# Patient Record
Sex: MALE | Race: WHITE | NOT HISPANIC OR LATINO | Employment: OTHER | URBAN - METROPOLITAN AREA
[De-identification: names, ages, dates, MRNs, and addresses within clinical notes are randomized per-mention and may not be internally consistent; named-entity substitution may affect disease eponyms.]

---

## 2017-06-15 ENCOUNTER — OFFICE VISIT - RIVER FALLS (OUTPATIENT)
Dept: FAMILY MEDICINE | Facility: CLINIC | Age: 62
End: 2017-06-15

## 2017-06-15 ASSESSMENT — MIFFLIN-ST. JEOR: SCORE: 1388.43

## 2018-02-06 ENCOUNTER — OFFICE VISIT - RIVER FALLS (OUTPATIENT)
Dept: FAMILY MEDICINE | Facility: CLINIC | Age: 63
End: 2018-02-06

## 2018-02-06 ASSESSMENT — MIFFLIN-ST. JEOR: SCORE: 1414.74

## 2018-02-07 LAB
CHOLEST SERPL-MCNC: 187 MG/DL
CHOLEST/HDLC SERPL: 2.9 {RATIO}
GLUCOSE BLD-MCNC: 97 MG/DL (ref 65–99)
HDLC SERPL-MCNC: 65 MG/DL
LDLC SERPL CALC-MCNC: 104 MG/DL
NONHDLC SERPL-MCNC: 122 MG/DL
PSA SERPL-MCNC: 0.9 NG/ML
TRIGL SERPL-MCNC: 87 MG/DL

## 2018-12-20 ENCOUNTER — AMBULATORY - RIVER FALLS (OUTPATIENT)
Dept: FAMILY MEDICINE | Facility: CLINIC | Age: 63
End: 2018-12-20

## 2019-01-31 ENCOUNTER — OFFICE VISIT - RIVER FALLS (OUTPATIENT)
Dept: FAMILY MEDICINE | Facility: CLINIC | Age: 64
End: 2019-01-31

## 2019-01-31 ASSESSMENT — MIFFLIN-ST. JEOR: SCORE: 1398.41

## 2019-02-01 ENCOUNTER — AMBULATORY - RIVER FALLS (OUTPATIENT)
Dept: FAMILY MEDICINE | Facility: CLINIC | Age: 64
End: 2019-02-01

## 2019-02-02 LAB
CHOLEST SERPL-MCNC: 186 MG/DL
CHOLEST/HDLC SERPL: 2.4 {RATIO}
GLUCOSE BLD-MCNC: 90 MG/DL (ref 65–99)
HBA1C MFR BLD: 5.6 %
HDLC SERPL-MCNC: 77 MG/DL
LDLC SERPL CALC-MCNC: 93 MG/DL
NONHDLC SERPL-MCNC: 109 MG/DL
TRIGL SERPL-MCNC: 75 MG/DL

## 2019-12-18 ENCOUNTER — AMBULATORY - RIVER FALLS (OUTPATIENT)
Dept: FAMILY MEDICINE | Facility: CLINIC | Age: 64
End: 2019-12-18

## 2020-01-21 ENCOUNTER — OFFICE VISIT - RIVER FALLS (OUTPATIENT)
Dept: FAMILY MEDICINE | Facility: CLINIC | Age: 65
End: 2020-01-21

## 2020-01-21 ASSESSMENT — MIFFLIN-ST. JEOR: SCORE: 1434.7

## 2020-01-22 LAB
CHOL/HDL RATIO - HISTORICAL: 2.9 UMOL/L
CHOLEST SERPL-MCNC: 167 MG/DL
GLUCOSE BLD-MCNC: 100 MG/DL
HDLC SERPL-MCNC: 57 MG/DL
LDLC SERPL CALC-MCNC: 94 MG/DL
TRIGL SERPL-MCNC: 71 MG/DL

## 2020-12-01 ENCOUNTER — AMBULATORY - RIVER FALLS (OUTPATIENT)
Dept: FAMILY MEDICINE | Facility: CLINIC | Age: 65
End: 2020-12-01

## 2020-12-03 ENCOUNTER — AMBULATORY - RIVER FALLS (OUTPATIENT)
Dept: FAMILY MEDICINE | Facility: CLINIC | Age: 65
End: 2020-12-03

## 2021-01-20 ENCOUNTER — OFFICE VISIT - RIVER FALLS (OUTPATIENT)
Dept: FAMILY MEDICINE | Facility: CLINIC | Age: 66
End: 2021-01-20

## 2021-01-20 ASSESSMENT — MIFFLIN-ST. JEOR: SCORE: 1436.51

## 2021-01-27 ENCOUNTER — COMMUNICATION - RIVER FALLS (OUTPATIENT)
Dept: FAMILY MEDICINE | Facility: CLINIC | Age: 66
End: 2021-01-27

## 2021-04-08 ENCOUNTER — AMBULATORY - RIVER FALLS (OUTPATIENT)
Dept: FAMILY MEDICINE | Facility: CLINIC | Age: 66
End: 2021-04-08

## 2021-05-06 ENCOUNTER — AMBULATORY - RIVER FALLS (OUTPATIENT)
Dept: FAMILY MEDICINE | Facility: CLINIC | Age: 66
End: 2021-05-06

## 2021-05-27 LAB
CHOLEST SERPL-MCNC: 197 MG/DL
GLUCOSE BLD-MCNC: 104 MG/DL
HDLC SERPL-MCNC: 67 MG/DL
LDLC SERPL CALC-MCNC: 113 MG/DL
PSA SERPL-MCNC: 1.3 NG/ML
TRIGL SERPL-MCNC: 80 MG/DL

## 2021-12-22 ENCOUNTER — COMMUNICATION - RIVER FALLS (OUTPATIENT)
Dept: FAMILY MEDICINE | Facility: CLINIC | Age: 66
End: 2021-12-22

## 2022-02-11 VITALS
SYSTOLIC BLOOD PRESSURE: 120 MMHG | WEIGHT: 155.6 LBS | HEART RATE: 64 BPM | HEIGHT: 65 IN | BODY MASS INDEX: 25.92 KG/M2 | DIASTOLIC BLOOD PRESSURE: 78 MMHG | TEMPERATURE: 97.2 F

## 2022-02-11 VITALS
BODY MASS INDEX: 24.96 KG/M2 | TEMPERATURE: 97.3 F | WEIGHT: 149.8 LBS | HEART RATE: 64 BPM | DIASTOLIC BLOOD PRESSURE: 88 MMHG | SYSTOLIC BLOOD PRESSURE: 126 MMHG | HEIGHT: 65 IN

## 2022-02-11 VITALS
TEMPERATURE: 97.6 F | BODY MASS INDEX: 26.66 KG/M2 | DIASTOLIC BLOOD PRESSURE: 64 MMHG | WEIGHT: 160 LBS | HEIGHT: 65 IN | HEART RATE: 62 BPM | SYSTOLIC BLOOD PRESSURE: 118 MMHG

## 2022-02-11 VITALS
SYSTOLIC BLOOD PRESSURE: 134 MMHG | BODY MASS INDEX: 26.73 KG/M2 | HEIGHT: 65 IN | HEART RATE: 71 BPM | DIASTOLIC BLOOD PRESSURE: 88 MMHG | WEIGHT: 160.4 LBS | TEMPERATURE: 97.3 F | OXYGEN SATURATION: 92 %

## 2022-02-11 VITALS
HEIGHT: 65 IN | BODY MASS INDEX: 25.33 KG/M2 | DIASTOLIC BLOOD PRESSURE: 68 MMHG | HEART RATE: 64 BPM | SYSTOLIC BLOOD PRESSURE: 112 MMHG | WEIGHT: 152 LBS | TEMPERATURE: 97.7 F

## 2022-02-14 ENCOUNTER — OFFICE VISIT - RIVER FALLS (OUTPATIENT)
Dept: FAMILY MEDICINE | Facility: CLINIC | Age: 67
End: 2022-02-14

## 2022-02-15 NOTE — PROGRESS NOTES
Patient:   VIPIN FRANCISCO            MRN: [id number]            FIN: [id number]               Age:   64 years     Sex:  Male     :  1955   Associated Diagnoses:   Well adult; HLD (hyperlipidemia); Prediabetes; Agatston coronary artery calcium score less than 100   Author:   Wayne Reddy MD      Visit Information      Date of Service: 2020 08:11 am  Performing Location: [facility]  Encounter#: [id number]      Primary Care Provider (PCP):  Wayne Reddy MD    NPI# [id number]      Referring Provider:  Wayne Reddy MD# [id number]   Visit type:  Annual exam.    Accompanied by:  No one.    Source of history:  Self, Medical record.    History limitation:  None.       Chief Complaint   2020 8:23 AM CST    Annual Physical      Well Adult History   Well Adult History             The patient presents for well adult exam.  The patient's general health status is described as good.  The patient's diet is described as balanced.  Exercise: routine.  Associated symptoms consist of none.  Medical encounters: none.  Compliance problems: none.  Additional pertinent history: daily caffeine use, tobacco use none and occasional alcohol use.       colonoscopy:  due  lipid and diabetes screening:  due  prostate cancer screening:  up to date   heart disease risk:  moderate  immunizations:  up to date   other:  hyperlipidemia doing well         Review of Systems   Constitutional:  No fever, No chills, No sweats, No weakness, No fatigue.    Eye:  No recent visual problem.    Ear/Nose/Mouth/Throat:  No decreased hearing, No nasal congestion, No sore throat.    Respiratory:  No shortness of breath, No cough.    Cardiovascular:  Negative, No chest pain, No palpitations, No peripheral edema.    Gastrointestinal:  No nausea, No vomiting, No diarrhea, No constipation, No heartburn.    Genitourinary:  No dysuria, No change in urine stream.    Hematology/Lymphatics:  No bruising tendency, No  bleeding tendency.    Endocrine:  No cold intolerance, No heat intolerance.    Immunologic:  Negative.    Musculoskeletal:  No back pain, No neck pain, No joint pain, No muscle pain.    Integumentary:  No rash, No dryness, No skin lesion.    Neurologic:  Alert and oriented X4, No headache.    Psychiatric:  No anxiety, No depression.      PHQ9 and CAGE reviewed and discussed with patient.       Health Status   Allergies:    Allergic Reactions (Selected)  Severity Not Documented  Aspirin (Anaphylaxis)   Medications:  (Selected)   Prescriptions  Prescribed  Flovent HFA 44 mcg/inh inhalation aerosol: = 2 puff(s), Inhale, bid, # 1 EA, 3 Refill(s), Type: Maintenance  albuterol 90 mcg/inh inhalation aerosol: See Instructions, Instructions: 2 puffs inhaled 1-4 times daily prn, PRN: as needed for wheezing, # 3 EA, 5 Refill(s), Type: Maintenance, resending with direction clarification  atorvastatin 20 mg oral tablet: = 1 tab(s), Oral, daily, # 90 tab(s), 3 Refill(s), VIKTOR, Type: Maintenance, Pharmacy: [facility], 1 tab(s) Oral daily  predniSONE 20 mg oral tablet: See Instructions, Instructions: TAKE 1 TABLET BY MOUTH 2 DAILY AS NEEDED FOR ALLERGY SYMPTOMS, # 60 tab(s), Type: Soft Stop, Pharmacy: [facility]/pharmacy #[id number]   Problem list:    All Problems  Right shoulder tendonitis / SNOMED CT 8349638786 / Confirmed  Left hydrocele / SNOMED CT 5741932835 / Confirmed  Prediabetes / SNOMED CT 5809706068 / Confirmed  Right inguinal hernia / SNOMED CT 837750018 / Confirmed  Seasonal allergies / SNOMED CT 887591096 / Confirmed  HLD (hyperlipidemia) / SNOMED CT 29679523 / Confirmed      Histories   Past Medical History:    Active  Prediabetes (7874262859): Onset in 2007 at 52 years.  HLD (hyperlipidemia) (44402848)  Right inguinal hernia (860670553)  Right shoulder tendonitis (3040319043)  Left hydrocele (1015091321)  Seasonal allergies (157078524)  Comments:  9/15/2010 CDT 11:17 AM DALILA - Sade Stewart  In the spring.    Family History:    Diabetes mellitus type 2  Father (Terry)  Myocardial infarction  Grandfather (P) ()  Grandfather (M) ()  Alzheimer's disease  Mother (Patience, )  CA - Cancer of prostate  Father (Terry)  Brother (Shayne)  Coronary artery disease  Father (Terry)  High cholesterol  Father (Terry)     Procedure history:    Cardiac computed tomography for calcium scoring (SNOMED CT 1521327441) on 2016 at 61 Years.  Comments:  2016 11:14 AM CST - Dilma Murphy  Total Agatston calcium score is 27  Left hydrocelectomy/ right spermatocelectomy on 2011 at 55 Years.  Colonoscopy (SNOMED CT 001919419) on 2009 at 53 Years.  Comments:  6/15/2017 12:56 PM CDT - Antonina MCINTYRE, Sade  Indication:  screening  Sedation:  IV  Findings:  no polyps seen  Recommendation:  f/u 10yrs  Right shoulder superolabral debridement; arthroscopic subacromial decompression on 2003 at 47 Years.  Bladder surgery/TURP in  at 43 Years.  Right hip surgery in  at 19 Years.  Tonsillectomy and adenoidectomy (SNOMED CT 016568932) in  at 7 Years.   Social History:        Alcohol Assessment            Current, 5 times per week, 2 drinks/episode average.      Tobacco Assessment            Never smoker      Substance Abuse Assessment            Never      Employment and Education Assessment            Work/School description: Contractor/Farmer.      Home and Environment Assessment            Marital status: .  Spouse/Partner name: Izabella Damon.  Risks in environment: owns secured gun.      Nutrition and Health Assessment            Type of diet: Regular.      Exercise and Physical Activity Assessment            Exercise frequency: Daily.  Exercise type: manual work.      Sexual Assessment            Sexually active: Yes.  Identifies as male, Sexual orientation: Straight or heterosexual.        Physical Examination   Vital Signs   2020 8:23 AM CST Temperature Tympanic 97.6 DegF  LOW     Peripheral Pulse Rate 62 bpm    Pulse Site Radial artery    HR Method Manual    Systolic Blood Pressure 118 mmHg    Diastolic Blood Pressure 64 mmHg    Mean Arterial Pressure 82 mmHg    BP Site Right arm    BP Method Manual      Measurements from flowsheet : Measurements   1/21/2020 8:23 AM CST Height Measured - Standard 64.5 in    Weight Measured - Standard 160 lb    BSA 1.82 m2    Body Mass Index 27.04 kg/m2  HI      General:  Alert and oriented, No acute distress.    Eye:  Pupils are equal, round and reactive to light, Extraocular movements are intact, Normal conjunctiva.    HENT:  Normocephalic, Tympanic membranes are clear, Oral mucosa is moist, No pharyngeal erythema, No sinus tenderness.    Neck:  Supple, Non-tender, No carotid bruit, No lymphadenopathy, No thyromegaly.    Respiratory:  Lungs are clear to auscultation, Respirations are non-labored, Breath sounds are equal, No chest wall tenderness.    Cardiovascular:  Normal rate, Regular rhythm, No murmur, No gallop, Good pulses equal in all extremities, Normal peripheral perfusion, No edema.    Gastrointestinal:  Soft, Non-tender, Non-distended, Normal bowel sounds, No organomegaly.    Genitourinary:  No costovertebral angle tenderness.    Lymphatics:  WNL.    Musculoskeletal:  Normal range of motion, Normal strength, No tenderness, No swelling, No deformity.    Integumentary:  Warm, Dry, No rash.    Neurologic:  Alert, Oriented, Normal sensory, Normal motor function, No focal deficits.    Psychiatric:  Cooperative, Appropriate mood & affect.       Impression and Plan   Diagnosis     Well adult (UNN68-EC Z00.00).     HLD (hyperlipidemia) (LGZ40-SF E78.5).     Agatston coronary artery calcium score less than 100 (UDH47-UU R93.1).     Prediabetes (KEF69-AL R73.03).     Course:  Progressing as expected, Well controlled.    Plan:  Discussed health maintenance, diet, exercise, BMI discussed, continue current medication, lipid panel and glucose today, set up  colonoscopy.    Orders     Orders (Selected)   Outpatient Orders  Ordered  Return to Clinic (Request): RFV: Yearly exam/physical, Return in 1 year  Prescriptions  Prescribed  Flovent HFA 44 mcg/inh inhalation aerosol: = 2 puff(s), Inhale, bid, # 1 EA, 3 Refill(s), Type: Maintenance  albuterol 90 mcg/inh inhalation aerosol: See Instructions, Instructions: 2 puffs inhaled 1-4 times daily prn, PRN: as needed for wheezing, # 3 EA, 5 Refill(s), Type: Maintenance, resending with direction clarification  atorvastatin 20 mg oral tablet: = 1 tab(s), Oral, daily, # 90 tab(s), 3 Refill(s), VIKTOR, Type: Maintenance, Pharmacy: [facility], 1 tab(s) Oral daily.

## 2022-02-15 NOTE — PROGRESS NOTES
Patient:   VIPIN FRANCISCO            MRN: [id number]            FIN: [id number]               Age:   62 years     Sex:  Male     :  1955   Associated Diagnoses:   Well adult; HLD (hyperlipidemia); Seasonal Allergies   Author:   Wayne Reddy MD      Visit Information      Date of Service: 2018 07:55 am  Performing Location: [facility]  Encounter#: [id number]      Primary Care Provider (PCP):  Derek Barriga MD    NPI# [id number]   Visit type:  Annual exam.    Accompanied by:  No one.    Source of history:  Self, Medical record.    History limitation:  None.       Chief Complaint   2018 7:59 AM CST     Physical      Well Adult History   Well Adult History             The patient presents for well adult exam.  The patient's general health status is described as good.  The patient's diet is described as balanced.  Exercise: none.  Associated symptoms consist of none.  Medical encounters: none.  Compliance problems: none.  Additional pertinent history: daily caffeine use, tobacco use none and occasional alcohol use.       colonoscopy:  Due next year  lipid and diabetes screening:  due  prostate cancer screening:  due  heart disease risk:  6.4 % 10 year   immunizations:  due for influenza  other:  doing well with atorvastatin         Review of Systems   Constitutional:  No fever, No chills, No sweats, No weakness, No fatigue.    Eye:  No recent visual problem.    Ear/Nose/Mouth/Throat:  No decreased hearing, No nasal congestion, No sore throat.    Respiratory:  No shortness of breath, No cough.    Cardiovascular:  Negative, No chest pain, No palpitations, No peripheral edema.    Gastrointestinal:  No nausea, No vomiting, No diarrhea, No constipation, No heartburn.    Genitourinary:  No dysuria, No change in urine stream.    Hematology/Lymphatics:  No bruising tendency, No bleeding tendency.    Endocrine:  No cold intolerance, No heat intolerance.    Immunologic:  Negative.     Musculoskeletal:  No back pain, No neck pain, No joint pain, No muscle pain.    Integumentary:  No rash, No dryness, No skin lesion.    Neurologic:  Alert and oriented X4, No headache.    Psychiatric:  No anxiety, No depression.      PHQ9 and CAGE reviewed and discussed with patient.       Health Status   Allergies:    Allergic Reactions (Selected)  Severity Not Documented  Aspirin (Anaphylaxis)   Medications:  (Selected)   Prescriptions  Prescribed  atorvastatin 20 mg oral tablet: 1 tab(s) ( 20 mg ), PO, Daily, # 90 tab(s), 3 Refill(s), Type: Maintenance, Pharmacy: [facility], 1 tab(s) po daily   Problem list:    All Problems  HLD (hyperlipidemia) / SNOMED CT 57737075 / Confirmed  Left hydrocele / SNOMED CT 8716136674 / Confirmed  Prediabetes / SNOMED CT 8960374205 / Confirmed  Right inguinal hernia / SNOMED CT 637508726 / Confirmed  Right shoulder tendonitis / SNOMED CT 7831294606 / Confirmed  Seasonal allergies / SNOMED CT 647764789 / Confirmed      Histories   Past Medical History:    Active  Prediabetes (7851025362): Onset in  at 52 years.  HLD (hyperlipidemia) (29209147)  Right inguinal hernia (686981031)  Right shoulder tendonitis (5381931981)  Left hydrocele (6245136900)  Seasonal allergies (168993819)  Comments:  9/15/2010 CDT 11:17 AM CDT - Sade Stewart  In the spring.   Family History:    Diabetes mellitus type 2  Father (Terry)  Myocardial infarction  Grandfather (P) ()  Grandfather (M) ()  Alzheimer's disease  Mother (Patience, )  CA - Cancer of prostate  Father (Terry)  Brother (Shayne)  Coronary artery disease  Father (Terry)     Procedure history:    Cardiac computed tomography for calcium scoring (SNOMED CT 2490102016) on 2016 at 61 Years.  Comments:  2016 11:14 AM - Dilma Murphy  Total Agatston calcium score is 27  Left hydrocelectomy/ right spermatocelectomy on 2011 at 55 Years.  Colonoscopy (SNOMED CT 464677822) on 2009 at  53 Years.  Comments:  6/15/2017 12:56 PM - Antonina MCINTYRE, Sade  Indication:  screening  Sedation:  IV  Findings:  no polyps seen  Recommendation:  f/u 10yrs  Right shoulder superolabral debridement; arthroscopic subacromial decompression on 5/27/2003 at 47 Years.  Bladder surgery/TURP in 1998 at 43 Years.  Right hip surgery in 1974 at 19 Years.  Tonsillectomy and adenoidectomy (SNOMED CT 368513088) in 1962 at 7 Years.   Social History:        Alcohol Assessment            Current, 3 times per week, 2 drinks/episode average.      Tobacco Assessment            Never smoker      Substance Abuse Assessment            Never      Employment and Education Assessment            Work/School description: Contractor/Farmer.      Home and Environment Assessment            Marital status: .  Spouse/Partner name: Izabella Damon.  Risks in environment: owns secured gun.      Nutrition and Health Assessment            Type of diet: Regular.      Exercise and Physical Activity Assessment            Exercise frequency: Never.      Sexual Assessment            Sexual orientation: Heterosexual.        Physical Examination   Vital Signs   2/6/2018 7:59 AM CST Temperature Tympanic 97.2 DegF  LOW    Peripheral Pulse Rate 64 bpm    Pulse Site Radial artery    HR Method Manual    Systolic Blood Pressure 120 mmHg    Diastolic Blood Pressure 78 mmHg    Mean Arterial Pressure 92 mmHg    BP Site Right arm    BP Method Manual      Measurements from flowsheet : Measurements   2/6/2018 7:59 AM CST Height Measured - Standard 64.5 in    Weight Measured - Standard 155.6 lb    BSA 1.79 m2    Body Mass Index 26.29 kg/m2  HI      General:  Alert and oriented, No acute distress.    Eye:  Pupils are equal, round and reactive to light, Extraocular movements are intact, Normal conjunctiva.    HENT:  Normocephalic, Tympanic membranes are clear, Oral mucosa is moist, No pharyngeal erythema, No sinus tenderness.    Neck:  Supple, Non-tender, No carotid  bruit, No lymphadenopathy, No thyromegaly.    Respiratory:  Lungs are clear to auscultation, Respirations are non-labored, Breath sounds are equal, No chest wall tenderness.    Cardiovascular:  Normal rate, Regular rhythm, No murmur, No gallop, Good pulses equal in all extremities, Normal peripheral perfusion, No edema.    Gastrointestinal:  Soft, Non-tender, Non-distended, Normal bowel sounds, No organomegaly.    Genitourinary:  No costovertebral angle tenderness.    Lymphatics:  WNL.    Musculoskeletal:  Normal range of motion, Normal strength, No tenderness, No swelling, No deformity.    Integumentary:  Warm, Dry, No rash.    Neurologic:  Alert, Oriented, Normal sensory, Normal motor function, No focal deficits.    Psychiatric:  Cooperative, Appropriate mood & affect.       Impression and Plan   Diagnosis     Well adult (TMO38-PB Z00.00).     HLD (hyperlipidemia) (XSP47-AG E78.5).     Seasonal Allergies (BLS08-NT J30.2).     Course:  Progressing as expected.    Plan:  Discussed health maintenance, diet, exercise, BMI discussed, continue current medication.    Orders     Orders (Selected)   Outpatient Orders  Ordered (In Transit)  Glucose* ([facility]): Specimen Type: Serum, Collection Date: 02/06/18 8:35:00 CST  Lipid panel with reflex to direct ldl* ([facility]): Specimen Type: Serum, Collection Date: 02/06/18 8:35:00 CST  PSA, Total (Room)* ([facility]): Specimen Type: Serum, Collection Date: 02/06/18 8:35:00 CST  Prescriptions  Prescribed  atorvastatin 20 mg oral tablet: 1 tab(s) ( 20 mg ), PO, Daily, # 90 tab(s), 3 Refill(s), Type: Maintenance, Pharmacy: [facility], 1 tab(s) po daily.

## 2022-02-15 NOTE — TELEPHONE ENCOUNTER
Order, demographics, and notes brought to [facility] per request, patient is aware they will contact him to schedule.

## 2022-02-15 NOTE — TELEPHONE ENCOUNTER
---------------------  From: Wayne Reddy MD   To: VIPIN FRANCISCO    Sent: 2/4/2019 1:28:39 PM CST  Subject: Patient Message - Results Notification        Your tests are normal    Results:  Date Result Name Value Ref Range   2/1/2019 9:52 AM Glucose Level 90 mg/dL (65 - 99)   2/1/2019 9:52 AM Hgb A1c 5.6 ( - <5.7)   2/1/2019 9:52 AM Cholesterol 186 mg/dL ( - <200)   2/1/2019 9:52 AM Non-HDL Cholesterol 109 ( - <130)   2/1/2019 9:52 AM HDL 77 mg/dL (>40 - )   2/1/2019 9:52 AM Cholesterol/HDL Ratio 2.4 ( - <5.0)   2/1/2019 9:52 AM LDL 93    2/1/2019 9:52 AM Triglyceride 75 mg/dL ( - <150)

## 2022-02-15 NOTE — PROGRESS NOTES
Chief Complaint    annual px, is fasting for blood work.  History of Present Illness      General health status:  good      Diet:  heart healthy      Exercise:  stays active with work      Medical encounters:  none      Dental exam:  up to date      Eye exam:  up to date      Caffeine use:  daily      Tobacco use:  no      Alcohol use:  weekly      Lipid and diabetes screening:  due      Colon cancer screening:  up to date      Prostate cancer screening:  due      Other concerns:  none      Chronic disease:  hyperlipidemia and allergic rhinitis under control         Review of Systems          Constitutional:  No fever, No chills, No sweats, No weakness, No fatigue.            Eye:  No recent visual problem.            Ear/Nose/Mouth/Throat:  No decreased hearing, No nasal congestion, No sore throat.            Respiratory:  No shortness of breath, No cough.            Cardiovascular:  Negative, No chest pain, No palpitations, No peripheral edema.            Gastrointestinal:  No nausea, No vomiting, No diarrhea, No constipation, No heartburn.            Genitourinary:  No dysuria, No change in urine stream.            Hematology/Lymphatics:  No bruising tendency, No bleeding tendency.            Endocrine:  No cold intolerance, No heat intolerance.            Immunologic:  Negative.            Musculoskeletal:  No back pain, No neck pain, No joint pain, No muscle pain.            Integumentary:  No rash, No dryness, No skin lesion.            Neurologic:  Alert and oriented X4, No headache.                Psychiatric:  No anxiety, No depression  Physical Exam   Vitals & Measurements    T: 97.3  F (Tympanic)  HR: 71 (Peripheral)  BP: 134/88  SpO2: 92%     HT: 64.5 in  WT: 160.4 lb  BMI: 27.1           General:  Alert and oriented, No acute distress.            Eye:  Pupils are equal, round and reactive to light, Extraocular movements are intact, Normal conjunctiva.            HENT:  Normocephalic, Tympanic membranes  are clear, Oral mucosa is moist, No pharyngeal erythema, No sinus tenderness.            Neck:  Supple, Non-tender, No carotid bruit, No lymphadenopathy, No thyromegaly.            Respiratory:  Lungs are clear to auscultation, Respirations are non-labored, Breath sounds are equal, No chest wall tenderness.            Cardiovascular:  Normal rate, Regular rhythm, No murmur, No gallop, Good pulses equal in all extremities, Normal peripheral perfusion, No edema.            Gastrointestinal:  Soft, Non-tender, Non-distended, Normal bowel sounds, No organomegaly.            Genitourinary:  No CVA tenderness          Lymphatics:  WNL.            Musculoskeletal:  Normal range of motion, Normal strength, No tenderness, No swelling, No deformity.            Integumentary:  Warm, Dry, No rash.            Neurologic:  Alert, Oriented, Normal sensory, Normal motor function, No focal deficits.            Psychiatric:  Cooperative, Appropriate mood & affect.   Assessment/Plan       1. Annual physical exam (Z00.00)         Discussed diet, weight management, BMI, activity and exercise        Follow up in one year       2. HLD (hyperlipidemia) (E78.5)         controlled, labs today       3. Seasonal allergies (J30.2)         controlled, meds refilled       Orders:         albuterol, 1 puff(s), Inhale, q4 hrs, PRN: as needed for wheezing, # 1 EA, 5 Refill(s), Type: Maintenance, resending with direction clarification, (Ordered)         atorvastatin, = 1 tab(s), Oral, daily, # 90 tab(s), 3 Refill(s), VIKTOR, Type: Maintenance, Pharmacy: [facility], 1 tab(s) Oral daily,x90 day(s), 64.5, in, 01/20/21 7:09:00 CST, Height Measured, 160.4, lb, 01/20/21 7:09:00 CST, Weight Measured, (Ordered)         fluticasone, = 2 puff(s), Inhale, bid, # 1 EA, 3 Refill(s), Type: Maintenance, (Ordered)         fluticasone, = 2 puff(s), Inhale, bid, # 1 EA, 3 Refill(s), Type: Hard Stop, (Completed)         predniSONE, = 2 tab(s) ( 40 mg ), Oral,  daily, # 14 tab(s), 1 Refill(s), Type: Soft Stop, (Ordered)         Return to Clinic (Request), RFV: Yearly exam/physical, Return in 1 year  Patient Information     Name:VIPIN FRANCISCO      Address:      [address] [place] [id number]     Sex:Male     YOB: 1955     [phone]     Emergency Contact:ROHINI FRANCISCO     MRN:[id number]     FIN:[id number]     Location:[facility]     Date of Service:01/20/2021      Primary Care Physician:       Wayne Reddy MD, [phone]      Attending Physician:       Wayne Reddy MD, [phone]  Problem List/Past Medical History    Ongoing     Agatston coronary artery calcium score less than 100     HLD (hyperlipidemia)     Left hydrocele     Prediabetes     Right inguinal hernia     Right shoulder tendonitis     Seasonal allergies       Comments: In the spring.    Historical     No qualifying data  Procedure/Surgical History     Colonoscopy (02/07/2020)            Comments: Indication:  screening      Sedation:  MAC      Findings:  no polyps/abnormalities seen      Recommendation:  f/u 10yrs.     Cardiac computed tomography for calcium scoring (11/09/2016)            Comments: Total Agatston calcium score is 27.     Left hydrocelectomy/ right spermatocelectomy (06/29/2011)           Colonoscopy (05/04/2009)            Comments: Indication:  screening      Sedation:  IV      Findings:  no polyps seen      Recommendation:  f/u 10yrs.     Right shoulder superolabral debridement; arthroscopic subacromial decompression (05/27/2003)           Bladder surgery/TURP (1998)           Right hip surgery (1974)           Tonsillectomy and adenoidectomy (1962)        Medications    albuterol 90 mcg/inh inhalation aerosol, 1 puff(s), Inhale, q4 hrs, PRN, 5 refills    atorvastatin 20 mg oral tablet, 1 tab(s), Oral, daily, 3 refills    Flovent HFA 44 mcg/inh inhalation aerosol, 2 puff(s), Inhale, bid, 3 refills    predniSONE 20  mg oral tablet, 40 mg= 2 tab(s), Oral, daily, 1 refills  Allergies    aspirin (Anaphylaxis)  Social History    Smoking Status     Never smoker     Alcohol      Current, 5 times per week, 2 drinks/episode average.     Electronic Cigarette/Vaping      Electronic Cigarette Use: Never.     Employment/School      Work/School description: [employer]or/[employer].     Exercise      Exercise frequency: Daily. Exercise type: manual work.     Home/Environment      Marital status: . Spouse/Partner name: Izabella Damon. Risks in environment: owns secured gun.     Nutrition/Health      Type of diet: Regular.     Sexual      Sexually active: Yes. Identifies as male, Sexual orientation: Straight or heterosexual.     Substance Abuse      Never     Tobacco      Never (less than 100 in lifetime)  Family History    Alzheimer's disease: Mother.    CA - Cancer of prostate: Father and Brother.    Coronary artery disease: Father.    Diabetes mellitus type 2: Father.    High cholesterol: Father.    Myocardial infarction: Grandfather (M) and Grandfather (P).    Sister: History is negative    Brother: History is negative    Sister: History is negative  Immunizations      Vaccine Date Status          influenza virus vaccine, inactivated 12/03/2020 Given          influenza virus vaccine, inactivated 12/18/2019 Given          influenza virus vaccine, inactivated 12/20/2018 Given          influenza virus vaccine, inactivated 02/06/2018 Given          influenza virus vaccine, inactivated 11/02/2016 Given          influenza virus vaccine, inactivated 11/16/2015 Given          influenza virus vaccine, inactivated 11/17/2014 Given          tetanus/diphth/pertuss (Tdap) adult/adol 07/09/2013 Given          influenza virus vaccine, inactivated 01/15/2013 Given          Td 12/16/2003 Recorded          Td 01/01/1990 Recorded              Comments : [9/15/2010] month and day unknown

## 2022-02-15 NOTE — NURSING NOTE
Comprehensive Intake Entered On:  1/20/2021 7:17 AM CST    Performed On:  1/20/2021 7:09 AM CST by Sade Sarkar MA               Summary   Chief Complaint :   annual px, is fasting for blood work.   Weight Measured :   160.4 lb(Converted to: 160 lb 6 oz, 72.756 kg)    Height Measured :   64.5 in(Converted to: 5 ft 4 in, 163.83 cm)    Body Mass Index :   27.1 kg/m2 (HI)    Body Surface Area :   1.82 m2   Systolic Blood Pressure :   134 mmHg (HI)    Diastolic Blood Pressure :   88 mmHg (HI)    Mean Arterial Pressure :   103 mmHg   Peripheral Pulse Rate :   71 bpm   BP Site :   Right arm   Pulse Site :   Radial artery   BP Method :   Manual   HR Method :   Manual   Temperature Tympanic :   97.3 DegF(Converted to: 36.3 DegC)  (LOW)    Oxygen Saturation :   92 % (LOW)    Sade Sarkar MA - 1/20/2021 7:09 AM CST   Health Status   Allergies Verified? :   Yes   Medication History Verified? :   Yes   Immunizations Current :   No   Medical History Verified? :   Yes   Pre-Visit Planning Status :   Completed   Tobacco Use? :   Never smoker   Sade Sarkar MA - 1/20/2021 7:09 AM CST   Consents   Consent for Immunization Exchange :   Consent Granted   Consent for Immunizations to Providers :   Consent Granted   Sade Sarkar MA - 1/20/2021 7:09 AM CST   Meds / Allergies   (As Of: 1/20/2021 7:17:10 AM CST)   Allergies (Active)   aspirin  Estimated Onset Date:   Unspecified ; Reactions:   Anaphylaxis ; Created By:   Sade Stewart; Reaction Status:   Active ; Category:   Drug ; Substance:   aspirin ; Type:   Allergy ; Updated By:   Sade Stewart; Source:   Paper Chart ; Reviewed Date:   1/21/2020 9:45 AM CST        Medication List   (As Of: 1/20/2021 7:17:10 AM CST)   Prescription/Discharge Order    albuterol  :   albuterol ; Status:   Prescribed ; Ordered As Mnemonic:   albuterol 90 mcg/inh inhalation aerosol ; Simple Display Line:   See Instructions, 2 puffs inhaled 1-4 times daily prn, PRN: as needed for wheezing, 3  EA, 5 Refill(s) ; Ordering Provider:   Wayne Reddy MD; Catalog Code:   albuterol ; Order Dt/Tm:   1/21/2020 8:53:17 AM CST          atorvastatin  :   atorvastatin ; Status:   Prescribed ; Ordered As Mnemonic:   atorvastatin 20 mg oral tablet ; Simple Display Line:   1 tab(s), Oral, daily, 30 tab(s), 0 Refill(s) ; Ordering Provider:   Wayne Reddy MD; Catalog Code:   atorvastatin ; Order Dt/Tm:   12/29/2020 5:34:09 AM CST          fluticasone  :   fluticasone ; Status:   Prescribed ; Ordered As Mnemonic:   Flovent HFA 44 mcg/inh inhalation aerosol ; Simple Display Line:   2 puff(s), Inhale, bid, 1 EA, 3 Refill(s) ; Ordering Provider:   Wayne Reddy MD; Catalog Code:   fluticasone ; Order Dt/Tm:   1/21/2020 8:53:16 AM CST          predniSONE 20 mg oral tablet  :   predniSONE 20 mg oral tablet ; Status:   Prescribed ; Ordered As Mnemonic:   predniSONE 20 mg oral tablet ; Simple Display Line:   See Instructions, TAKE 1 TABLET BY MOUTH 2 DAILY AS NEEDED FOR ALLERGY SYMPTOMS, 60 tab(s) ; Ordering Provider:   Wayne Reddy MD; Catalog Code:   predniSONE ; Order Dt/Tm:   5/31/2019 4:09:36 PM CDT            ID Risk Screen   Recent Travel History :   No recent travel   Family Member Travel History :   No recent travel   Other Exposure to Infectious Disease :   Unknown   Sade Sarkar MA - 1/20/2021 7:09 AM CST   Social History   Social History   (As Of: 1/20/2021 7:17:10 AM CST)   Alcohol:        Current, 5 times per week, 2 drinks/episode average.   (Last Updated: 2/1/2019 8:59:48 AM CST by Alisa Gross)          Tobacco:        Never (less than 100 in lifetime)   (Last Updated: 1/20/2021 7:10:21 AM CST by Sade Sarkar MA)          Electronic Cigarette/Vaping:        Electronic Cigarette Use: Never.   (Last Updated: 1/20/2021 7:10:24 AM CST by Sade Sarkar MA)          Substance Abuse:        Never   (Last Updated: 11/2/2016 7:57:41 PM CDT by Brandy Liu CMA)           Employment/School:        Work/School description: [employer]or/[employer].   (Last Updated: 9/13/2012 8:50:51 PM CDT by Tisha Thapa CMA)          Home/Environment:        Marital status: .  Spouse/Partner name: Izabella Damon.  Risks in environment: owns secured gun.   (Last Updated: 6/16/2017 1:47:05 PM CDT by Cinthia Carroll)          Nutrition/Health:        Type of diet: Regular.   (Last Updated: 9/13/2012 8:47:28 PM CDT by Tisha Thapa CMA)          Exercise:        Exercise frequency: Daily.  Exercise type: manual work.   (Last Updated: 2/6/2018 9:20:12 AM CST by Sade Sarkar MA)          Sexual:        Sexually active: Yes.  Identifies as male, Sexual orientation: Straight or heterosexual.   (Last Updated: 2/1/2019 1:23:15 PM CST by Cinthia Carroll)

## 2022-02-15 NOTE — TELEPHONE ENCOUNTER
---------------------  From: Sade Sarkar MA (eRx Pool ([id number]))   To: [facility] Message Pool ([id number] [facility] [employer]);     Sent: 12/6/2021 9:07:12 AM CST  Subject: FW: Medication Management   Due Date/Time: 12/6/2021 9:25:00 PM CST           ------------------------------------------  From: [facility]  To: Wayne Reddy MD  Sent: December 4, 2021 9:25:35 PM CST  Subject: Medication Management  Due: November 16, 2021 3:34:48 PM CST     ** On Hold Pending Signature **     Drug: atorvastatin (atorvastatin 20 mg oral tablet), TAKE 1 TABLET BY MOUTH DAILY  Quantity: 90 tab(s)  Days Supply: 90  Refills: 3  Substitutions Allowed  Notes from Pharmacy: - First Attempt Ref: [id number]     Dispensed Drug: atorvastatin (atorvastatin 20 mg oral tablet), TAKE 1 TABLET BY MOUTH DAILY  Quantity: 90 tab(s)  Days Supply: 90  Refills: 3  Substitutions Allowed  Notes from Pharmacy: Requesting 1 year supply  ---------------------------------------------------------------  From: Sade Sarkar MA   To: [facility]    Sent: 12/6/2021 9:17:13 AM CST  Subject: FW: Medication Management     ** Submitted: **  Order:atorvastatin (atorvastatin 20 mg oral tablet)  1 tab(s)  Oral  daily  Qty:  30 tab(s)        Refills:  0          VIKTOR     Route To Pharmacy - [facility]    Signed by Sade Sarkar MA  12/6/2021 3:16:00 PM [facility]    ** Submitted: **  Complete:atorvastatin (atorvastatin 20 mg oral tablet)   Signed by Sade Sarkar MA  12/6/2021 3:17:00 PM [facility]    ** Not Approved:  **  atorvastatin (Atorvastatin Calcium 20 MG Oral Tablet)  TAKE 1 TABLET BY MOUTH  DAILY  Qty:  90 tab(s)        Days Supply:  90        Refills:  3          VIKTOR     Route To Pharmacy - [facility]   Note from Pharmacy:  Requesting 1 year supply  Signed by Antonina MCINTYRE, PamRTC due Jan 2022 for px/fasting labs

## 2022-02-15 NOTE — NURSING NOTE
CAGE Assessment Entered On:  1/20/2021 9:58 AM CST    Performed On:  1/20/2021 9:58 AM CST by Sade Sarkar MA               Assessment   Have you ever felt you should cut down on your drinking :   No   Have people annoyed you by criticizing your drinking :   No   Have you ever felt bad or guilty about your drinking :   No   Have you ever taken a drink first thing in the morning to steady your nerves or get rid of a hangover (Eye-opener) :   No   CAGE Score :   0    Sade Sarkar MA - 1/20/2021 9:58 AM CST

## 2022-02-15 NOTE — TELEPHONE ENCOUNTER
---------------------From: Aurora Mcginnis RN (Phone Messages Pool ([address] [place] [address])) To: [facility] Message Pool ([address] [place] [address]);   Sent: 1/27/2021 3:21:48 PM CSTSubject: FW: Recent blood test, please forward to Dr. Reddy ---------------------From: VIPIN FRANCISCOTo: [facility] VegasSent: 01/27/2021 03:19 p.m. CSTSubject: Recent blood test, please forward to Dr. ReddyMy LDL is up 19 points and non HDL is up 20 should I be concerned? My HDL is up 10 which should be a plus. Also my PSA is up from my last test which was 2015 at .9 would guess this is not significant. Let me know if I need to call Dr. Reddy. Vipin Francisco---------------------From: Shasta Rivera LPN ([facility] Message Pool ([address] [place] [address])) To: Wayne Reddy MD;   Sent: 1/27/2021 3:38:16 PM CSTSubject: FW: Recent blood test, please forward to Dr. Reddy---------------------From: Wayne Reddy MD To: [facility] ([address] [place] [address]);   Sent: 1/28/2021 3:14:12 PM CSTSubject: RE: Recent blood test, please forward to Dr. Reddy The cholesterol numbers look good.  No medication changes are indicated.  PSA should be rechecked in a couple of years.---------------------From: Shasta Rivera LPN ([facility] Message Pool ([address] [place] [address])) To: VIPIN FRANCISCO  Sent: 1/28/2021 3:17:17 PM CSTSubject: FW: Recent blood test, please forward to Dr. Reddy

## 2022-02-15 NOTE — PROGRESS NOTES
Chief Complaint  is scheduled for px--last done 11/2/16. discuss labs from last fall.  History of Present Illness  Patient is here for discussion of his recent laboratory results. ?He has concerns about his risk for heart disease. ?Recent lipid panel revealed LDL level of 126. ?He has always had fairly good cholesterol results with an elevated HDL. ?Recent coronary calcium score of 27. ?He has a family history of heart disease and diabetes. ?His fasting glucose numbers are borderline. ?He is active and maintains good weight. ?His?cognizant heart healthy diet.  Review of Systems  Constitutional: Negative.  Eye: Negative.  Ear/Nose/Mouth/Throat: Negative.  Respiratory: Negative.  Cardiovascular: Negative.  Gastrointestinal: Negative.  Genitourinary: Negative.  Hematology/Lymphatics: Negative.  Endocrine: Negative.  Immunologic: Negative.  Musculoskeletal: Negative.  Integumentary: Negative.  Neurologic: Negative.  Psychiatric: Negative.  All other systems reviewed and negative  Problem List/Past Medical History  Ongoing  HLD (Hyperlipidemia)  Left Hydrocele  Prediabetes  Right Inguinal Hernia  Right shoulder tendonitis  Seasonal Allergies  Resolved  No resolved problems  Procedure/Surgical History  Cardiac computed tomography for calcium scoring (11/09/2016),  Left hydrocelectomy/ right spermatocelectomy (06/29/2011),  Colonoscopy (05/04/2009),  Right shoulder superolabral debridement; arthroscopic subacromial decompression (05/27/2003),  Bladder surgery/TURP (1998),  Right hip surgery (1974),  Tonsillectomy and adenoidectomy (1962).  Home Medications  atorvastatin 20 mg oral tablet, 20 mg, 1 tab(s), po, daily, 2 refills  Allergies  aspirin?(Anaphylaxis)  Social History  Alcohol  2 drinks 3-4 x per week.  Employment and Education  Work/School description: [employer].  Exercise and Physical Activity - Regular exercise  Exercise frequency: Daily.  Home and Environment  Marital status: . Spouse/Partner  name: Izabella Damon.  Nutrition and Health  Type of diet: Regular.  Sexual  Sexual orientation: Heterosexual.  Substance Abuse  Never  Tobacco  Never smoker  Family History  Alzheimer's disease: Mother.  CA - Cancer of prostate: Father.  Coronary artery disease: Father.  Diabetes mellitus type 2: Father.  Myocardial infarction: Grandfather (M)  and Grandfather (P).  Immunizations  Physical Exam  Vitals & Measurements  T:?97.3?(Tympanic)?  HR:?64?(Peripheral)?  BP:?126/88?  HT:?64.5?in?  WT:?149.8?lb?  BMI:?25.31?  Alert, oriented, no acute distress  Normal heart rate  Nonlabored breathing  Lab Results  Reviewed recent lipid panel and coronary calcium score  Assessment/Plan  HLD (hyperlipidemia)  Discussed his current cardiac risk factors. ?He is over 5% risk in the next 10 years. ?With the addition of the known coronary artery disease I believe this increases his risk. ?We have also discussed the risk and benefit of statins as well as the rationale for starting on statins. ?He agrees to start atorvastatin 20 mg daily. will follow-up as needed  Orders:  atorvastatin, 1 tab(s) ( 20 mg ), PO, Daily, # 30 tab(s), 2 Refill(s), Type: Maintenance

## 2022-02-15 NOTE — TELEPHONE ENCOUNTER
Entered by Ally Lovett on January 14, 2019 4:58:15 PM CST  ---------------------  From: Ally Lovett   To: [facility]    Sent: 1/14/2019 4:58:15 PM CST  Subject: Medication Management     ** Submitted: **  Order:atorvastatin (atorvastatin 20 mg oral tablet)  1 tab(s)  Oral  daily  Qty:  90 tab(s)        Refills:  0          VIKTOR     Route To Pharmacy - [facility]    Signed by Ally Lovett  1/14/2019 4:57:00 PM    ** Submitted: **  Complete:atorvastatin (atorvastatin 20 mg oral tablet)   Signed by Ally Lovett  1/14/2019 4:58:00 PM    ** Not Approved:  **  atorvastatin (ATORVASTATIN TABS 20MG)  TAKE 1 TABLET DAILY  Qty:  90 tab(s)        Days Supply:  0        Refills:  1          VIKTOR     Route To Pharmacy - [facility]   Signed by Ally Lovett            Med Refill    Date of last office visit and reason:  px 2/6/18  Date of last Med Check / Px:   2/6/18  Date of last labs pertaining to med:  2/6/18  RTC order in chart:  Yes, due next month.             ------------------------------------------  From: [facility]  To: Wayne Reddy MD  Sent: January 13, 2019 11:22:59 PM CST  Subject: Medication Management  Due: January 14, 2019 11:22:59 PM CST    ** On Hold Pending Signature **  Drug: atorvastatin (atorvastatin 20 mg oral tablet)  1 TAB(S) ORAL DAILY  Quantity: 90 tab(s)     Days Supply: 0         Refills: 1  Substitutions Allowed  Notes from Pharmacy:     Dispensed Drug: atorvastatin (atorvastatin 20 mg oral tablet)  TAKE 1 TABLET DAILY  Quantity: 90 tab(s)     Days Supply: 0         Refills: 1  Substitutions Allowed  Notes from Pharmacy:   ------------------------------------------

## 2022-02-15 NOTE — NURSING NOTE
Hearing and Vision Screening Entered On:  1/20/2021 7:18 AM CST    Performed On:  1/20/2021 7:17 AM CST by Sade Sarkar MA               Hearing and Vision Screening   Audiogram Result Right Ear :   Fail   Audiogram Result Left Ear :   Fail   Hearing Screen Comments :   failed both ears at 4000Hz, does have hearing aids   Sade Sarkar MA - 1/20/2021 7:17 AM CST

## 2022-02-15 NOTE — TELEPHONE ENCOUNTER
---------------------  From: Lina Monzon CMA   To: Appointment Pool ([id number]);     Sent: 12/29/2020 5:37:01 AM CST  Subject: appt     Pt is due in January for annual px and fasting labs.  #30 given per protocol.  Pt is due for OV and labs prior to further refills.   Lina Monzon CMA  Problems          HLD (hyperlipidemia)          Right inguinal hernia          Right shoulder tendonitis          Left hydrocele          Seasonal allergies          Prediabetes          Agatston coronary artery calcium score less than 100patient was driving, will call back to schedule

## 2022-02-15 NOTE — TELEPHONE ENCOUNTER
---------------------  From: Sade Sarkar MA (eRx Pool (32224_[facility]))   To: ZACK Message Pool ([address]_[place]);     Sent: 11/23/2021 9:48:00 AM CST  Subject: FW: Medication Management   Due Date/Time: 11/23/2021 10:08:00 PM CST           ------------------------------------------  From: [facility]  To: Wayne Reddy MD  Sent: November 18, 2021 10:08:07 PM CST  Subject: Medication Management  Due: November 16, 2021 3:37:48 PM CST     ** On Hold Pending Signature **     Drug: atorvastatin (atorvastatin 20 mg oral tablet), TAKE 1 TABLET BY MOUTH DAILY  Quantity: 90 tab(s)  Days Supply: 90  Refills: 3  Substitutions Allowed  Notes from Pharmacy: - First Attempt Ref: [id number]     Dispensed Drug: atorvastatin (atorvastatin 20 mg oral tablet), TAKE 1 TABLET BY MOUTH DAILY  Quantity: 90 tab(s)  Days Supply: 90  Refills: 3  Substitutions Allowed  Notes from Pharmacy: Requesting 1 year supply     ** On Hold Pending Signature **     Drug: atorvastatin (atorvastatin 20 mg oral tablet), TAKE 1 TABLET BY MOUTH DAILY  Quantity: 90 tab(s)  Days Supply: 90  Refills: 3  Substitutions Allowed  Notes from Pharmacy: - First Attempt 2nd Attempt Ref: [id number]     Dispensed Drug: atorvastatin (atorvastatin 20 mg oral tablet), TAKE 1 TABLET BY MOUTH DAILY  Quantity: 90 tab(s)  Days Supply: 90  Refills: 3  Substitutions Allowed  Notes from Pharmacy: Requesting 1 year supply  ------------------------------------------  ** Not Approved: Refill not appropriate, duplicate order **  atorvastatin (Atorvastatin Calcium 20 MG Oral Tablet)  TAKE 1 TABLET BY MOUTH  DAILY  Qty:  90 tab(s)        Days Supply:  90        Refills:  3          VIKTOR     Route To Pharmacy - [facility]   Note from Pharmacy:  Requesting 1 year supply  Signed by Sade Sarkar MA---------------------  From: Sade Sarkar MA   To: [facility]    Sent: 11/23/2021 10:23:01 AM CST  Subject: FW: Medication Management     **  Submitted: **  Order:atorvastatin (atorvastatin 20 mg oral tablet)  1 tab(s)  Oral  daily  Qty:  90 tab(s)        Days Supply:  90        Refills:  0          VIKTOR     Route To Pharmacy - [facility]    Signed by Sade Sarkar MA  11/23/2021 4:22:00 PM [facility]    ** Submitted: **  Complete:atorvastatin (atorvastatin 20 mg oral tablet)   Signed by Sade Sarkar MA  11/23/2021 4:23:00 PM [facility]    ** Not Approved:  **  atorvastatin (Atorvastatin Calcium 20 MG Oral Tablet)  TAKE 1 TABLET BY MOUTH  DAILY  Qty:  90 tab(s)        Days Supply:  90        Refills:  3          VIKTOR     Route To Pharmacy - [facility]   Note from Pharmacy:  Requesting 1 year supply  [facility], message sent to pharmacy

## 2022-02-15 NOTE — TELEPHONE ENCOUNTER
Entered by Miky Butterfield CMA on December 22, 2021 5:29:17 PM CST  ---------------------  From: Miky Butterfield CMA   To: [facility]    Sent: 12/22/2021 5:29:13 PM CST  Subject: Medication Management     ** Not Approved: Patient needs appointment, Pt due for annual exam next month **  atorvastatin (Atorvastatin Calcium 20 MG Oral Tablet)  TAKE 1 TABLET BY MOUTH  DAILY  Qty:  30 tab(s)        Days Supply:  30        Refills:  11          VIKTOR     Route To Pharmacy - [facility]   Note from Pharmacy:  Requesting 1 year supply  Signed by Miky Butterfield CMA            ** Patient matched by Miky Butterfield CMA on 12/22/2021 5:28:32 PM CST **      ------------------------------------------  From: [facility]  To: Wayne Reddy MD  Sent: December 20, 2021 9:26:46 PM CST  Subject: Medication Management  Due: December 15, 2021 8:09:06 PM CST     ** On Hold Pending Signature **     Drug: atorvastatin (atorvastatin 20 mg oral tablet), TAKE 1 TABLET BY MOUTH DAILY  Quantity: 30 tab(s)  Days Supply: 30  Refills: 0  Substitutions Allowed  Notes from Pharmacy: - First Attempt Ref: [id number]     Dispensed Drug: atorvastatin (atorvastatin 20 mg oral tablet), TAKE 1 TABLET BY MOUTH DAILY  Quantity: 30 tab(s)  Days Supply: 30  Refills: 11  Substitutions Allowed  Notes from Pharmacy: Requesting 1 year supply  ------------------------------------------

## 2022-02-15 NOTE — TELEPHONE ENCOUNTER
---------------------  From: Kayla Wade CMA (eRx Pool (32224_[facility]))   To: [facility] Message Pool ([address]_[place]);     Sent: 5/31/2019 11:19:50 AM CDT  Subject: FW: Medication Management   Due Date/Time: 5/31/2019 1:26:00 AM CDT     last Px and seen/ prescribed 1/31/2019   Please advise on refill  RTC placed for 1/2020        ** Patient matched by Kayla Wade CMA on 5/31/2019 11:12:50 AM CDT **      ------------------------------------------  From: [id number]  To: Wayne Reddy MD  Sent: May 30, 2019 1:26:08 AM CDT  Subject: Medication Management  Due: May 31, 2019 1:26:08 AM CDT    ** On Hold Pending Signature **  Drug: predniSONE (predniSONE 20 mg oral tablet)  TAKE 1 TABLET BY MOUTH 2 DAILY AS NEEDED FOR ALLERGY SYMPTOMS  Quantity: 60 tab(s)     Days Supply: 30        Refills: 0  Substitutions Allowed  Notes from Pharmacy:     Dispensed Drug: predniSONE (predniSONE 20 mg oral tablet)  TAKE 1 TABLET BY MOUTH 2 DAILY AS NEEDED FOR ALLERGY SYMPTOMS  Quantity: 60 tab(s)     Days Supply: 30        Refills: 0  Substitutions Allowed  Notes from Pharmacy:     ** On Hold Pending Signature **  Drug: predniSONE (predniSONE 20 mg oral tablet)  TAKE 1 TABLET BY MOUTH 2 DAILY AS NEEDED FOR ALLERGY SYMPTOMS  Quantity: 60 tab(s)     Days Supply: 30        Refills: 0  Substitutions Allowed  Notes from Pharmacy:     Dispensed Drug: predniSONE (predniSONE 20 mg oral tablet)  TAKE 1 TABLET BY MOUTH 2 DAILY AS NEEDED FOR ALLERGY SYMPTOMS  Quantity: 60 tab(s)     Days Supply: 30        Refills: 0  Substitutions Allowed  Notes from Pharmacy:   ---------------------------------------------------------------  From: Shasta Rivera LPN ([facility] Message Pool ([facility]))   To: Wayne Reddy MD;     Sent: 5/31/2019 2:18:45 PM CDT  Subject: FW: Medication Management   Due Date/Time: 5/31/2019 1:26:00 AM CDT---------------------  From: Wayne Reddy MD   To: [facility]/pharmacy #[id number]    Sent:  5/31/2019 4:09:38 PM CDT  Subject: FW: Medication Management     ** Submitted: **  Complete:predniSONE (predniSONE 20 mg oral tablet)   Signed by Wayne Reddy MD  5/31/2019 4:09:00 PM    ** Approved **  predniSONE (PREDNISONE 20 MG TABLET)  TAKE 1 TABLET BY MOUTH 2 DAILY AS NEEDED FOR ALLERGY SYMPTOMS  Qty:  60 tab(s)        Days Supply:  30        Refills:  0          Substitutions Allowed     Route To Pharmacy - [facility]/pharmacy #[id number]       ** Approved **  predniSONE (PREDNISONE 20 MG TABLET)  TAKE 1 TABLET BY MOUTH 2 DAILY AS NEEDED FOR ALLERGY SYMPTOMS  Qty:  60 tab(s)        Days Supply:  30        Refills:  0          Substitutions Allowed     Route To Pharmacy - [facility]/pharmacy #[id number]

## 2022-02-15 NOTE — NURSING NOTE
Depression Screening Entered On:  2/1/2019 9:00 AM CST    Performed On:  1/31/2019 9:00 AM CST by Alisa Gross               Depression Screening   Feeling Down, Depressed, Hopeless :   Not at all   Little Interest - Pleasure in Activities :   Not at all   Initial Depression Screen Score :   0    Trouble Falling or Staying Asleep :   Not at all   Feeling Tired or Little Energy :   Not at all   Poor Appetite or Overeating :   Not at all   Feeling Bad About Yourself :   Not at all   Trouble Concentrating :   Not at all   Moving or Speaking Slowly :   Not at all   Thoughts Better Off Dead or Hurting Self :   Not at all   Detailed Depression Screen Score :   0    Total Depression Screen Score :   0    NOY Difficulty with Work, Home, Others :   Not difficult at all   Alisa Gross - 2/1/2019 9:00 AM CST

## 2022-02-15 NOTE — PROGRESS NOTES
Chief Complaint    Annual Physical  History of Present Illness      Patient here for annual physical. has been stable on current medication with no side effects. Has had some pinched nerve pain but is improving after therapy and staying active.      Weight one year ago 155lbs.  Today s weight 152 lbs.      Colon cancer screening status:  Due in May      Immunizations:  UTD      Using Flovent and has helped keep Allergies under control.  Review of Systems          Constitutional:  No fever, No chills, No sweats, No weakness, No fatigue.            Eye:  No recent visual problem.            Ear/Nose/Mouth/Throat:  No decreased hearing, No nasal congestion, No sore throat.            Respiratory:  No shortness of breath, No cough.            Cardiovascular:  Negative, No chest pain, No palpitations, No peripheral edema.            Gastrointestinal:  No nausea, No vomiting, No diarrhea, No constipation, No heartburn.            Genitourinary:  No dysuria, No change in urine stream.            Hematology/Lymphatics:  No bruising tendency, No bleeding tendency.            Endocrine:  No cold intolerance, No heat intolerance.            Immunologic:  Negative.            Musculoskeletal:  No back pain, No neck pain, No joint pain, No muscle pain.            Integumentary:  No rash, No dryness, skin lesion.            Neurologic:  Alert and oriented X4, No headache.                Psychiatric:  No anxiety, No depression  Physical Exam   Vitals & Measurements    T: 97.7   F (Tympanic)  HR: 64(Peripheral)  BP: 112/68     HT: 64.5 in  WT: 152 lb  BMI: 25.68       General: Alert and oriented, No acute distress.      Eye: Pupils are equal, round and reactive to light, Extraocular movements are intact, Normal conjunctiva.      HENT: Normocephalic, Tympanic membranes are clear, Oral mucosa is moist, No pharyngeal erythema, No sinus tenderness.      Neck: Supple, Non-tender, No carotid bruit, No lymphadenopathy, No thyromegaly.       Respiratory: Lungs are clear to auscultation, Respirations are non-labored, Breath sounds are equal, No chest wall tenderness.      Cardiovascular: Normal rate, Regular rhythm, No murmur, No gallop, Good pulses equal in all extremities, Normal peripheral perfusion, No edema.      Gastrointestinal: Soft, Non-tender, Non-distended, Normal bowel sounds, No organomegaly.      Genitourinary: No CVA tenderness      Lymphatics: WNL.      Musculoskeletal: Normal range of motion, Normal strength, No tenderness, No swelling, No deformity.      Integumentary: Warm, Dry, No rash.  7 mm pink, firm papule lateral left buttock      Neurologic: Alert, Oriented, Normal sensory, Normal motor function, No focal deficits.      Psychiatric: Cooperative, Appropriate mood & affect  Assessment/Plan       1. HLD (hyperlipidemia) (E78.5), HLD (hyperlipidemia) (E78.5)        Stable, continue on current medication.         Ordered:          Glucose* ([facility]), Specimen Type: Serum, Collection Date: 02/01/19 7:00:00 CST          Hemoglobin A1c* ([facility]), Specimen Type: Blood, Collection Date: 02/01/19 7:00:00 CST          Lipid panel with reflex to direct ldl* ([facility]), Specimen Type: Serum, Collection Date: 02/01/19 7:00:00 CST          Return to Clinic (Request), RFV: Fasting lipids, glucose and PSA. per [facility]                2. Annual physical exam (Z00.00)         RTC for Fasting lipids, glucose and PSA.  Stable, continue on current medication. Paper copy of prescriptions printed.         Ordered:          Return to Clinic (Request), RFV: Fasting lipids, glucose and PSA. per [facility]                Dermatofibroma (D23.9)         observe, reassurance                Prediabetes (R73.03)         Ordered:          Glucose* ([facility]), Specimen Type: Serum, Collection Date: 02/01/19 7:00:00 CST          Hemoglobin A1c* ([facility]), Specimen Type: Blood, Collection Date: 02/01/19 7:00:00 CST          Lipid panel with reflex to direct ldl* ([facility]), Specimen Type:  Serum, Collection Date: 02/01/19 7:00:00 CST                Orders:         albuterol, See Instructions, Instructions: 2 puffs inhaled 1-4 times daily prn, PRN: as needed for wheezing, # 3 EA, 5 Refill(s), Type: Maintenance, resending with direction clarification, (Ordered)         atorvastatin, = 1 tab(s), Oral, daily, # 90 tab(s), 3 Refill(s), VIKTOR, Type: Maintenance, Pharmacy: [facility], 1 tab(s) Oral daily, (Ordered)         fluticasone, = 2 puff(s), Inhale, bid, # 1 EA, 3 Refill(s), Type: Maintenance, (Ordered)         predniSONE, = 1 tab(s) ( 20 mg ), po, bid, PRN: for allergy symptoms, # 60 tab(s), 0 Refill(s), Type: Maintenance, (Ordered)      Shasta LLOYD LPRENETTA, acted solely as a scribe for, and in the presence of Dr. Wayne Reddy who performed the services.  Patient Information     Name:VIPIN FRANCISCO      Address:      [address] [place] [id number]     Sex:Male     YOB: 1955     [phone]     Emergency Contact:KENAN FRANCISCO     MRN:[id number]     FIN:[id number]     [facility]     Date of Service:01/31/2019      Primary Care Physician:       Wayne Reddy MD, [phone]      Attending Physician:       Wayne Reddy MD, [phone]  Problem List/Past Medical History    Ongoing     HLD (hyperlipidemia)     Left hydrocele     Prediabetes     Right inguinal hernia     Right shoulder tendonitis     Seasonal allergies       Comments: In the spring.    Historical     No qualifying data  Procedure/Surgical History     Cardiac computed tomography for calcium scoring (11/09/2016)            Comments: Total Agatston calcium score is 27.     Left hydrocelectomy/ right spermatocelectomy (06/29/2011)           Colonoscopy (05/04/2009)            Comments: Indication:  screening      Sedation:  IV      Findings:  no polyps seen      Recommendation:  f/u 10yrs.     Right shoulder superolabral debridement;  arthroscopic subacromial decompression (05/27/2003)           Bladder surgery/TURP (1998)           Right hip surgery (1974)           Tonsillectomy and adenoidectomy (1962)        Medications     atorvastatin 20 mg oral tablet: 1 tab(s), Oral, daily, 90 tab(s), 3 Refill(s).     Flovent HFA 44 mcg/inh inhalation aerosol: 2 puff(s), Inhale, bid, 1 EA, 3 Refill(s).     predniSONE 20 mg oral tablet: 20 mg, 1 tab(s), po, bid, PRN: for allergy symptoms, 60 tab(s), 0 Refill(s).     albuterol 90 mcg/inh inhalation aerosol: See Instructions, 2 puffs inhaled 1-4 times daily prn, PRN: as needed for wheezing, 3 EA, 5 Refill(s).          Allergies    aspirin (Anaphylaxis)  Social History    Smoking Status - 02/06/2018     Never smoker     Alcohol      Current, 3 times per week, 2 drinks/episode average., 06/16/2017     Employment and Education      Work/School description: [employer]or/[employer] 09/13/2012     Exercise and Physical Activity      Exercise frequency: Daily. Exercise type: manual work., 02/06/2018     Home and Environment      Marital status: . Spouse/Partner name: Izabella Damon. Risks in environment: owns secured gun., 06/16/2017     Nutrition and Health      Type of diet: Regular., 09/13/2012     Sexual      Sexual orientation: Heterosexual., 09/13/2012     Substance Abuse      Never, 11/02/2016     Tobacco      Never smoker, 11/02/2016  Family History    Alzheimer's disease: Mother.    CA - Cancer of prostate: Father and Brother.    Coronary artery disease: Father.    Diabetes mellitus type 2: Father.    Myocardial infarction: Grandfather (M) and Grandfather (P).    Brother: History is negative    Sister: History is negative    Sister: History is negative  Immunizations      Vaccine Date Status Comments      influenza virus vaccine, inactivated 12/20/2018 Given      influenza virus vaccine, inactivated 02/06/2018 Given      influenza virus vaccine, inactivated 11/02/2016 Given      influenza virus vaccine,  inactivated 11/16/2015 Given      influenza virus vaccine, inactivated 11/17/2014 Given      tetanus/diphth/pertuss (Tdap) adult/adol 07/09/2013 Given      influenza virus vaccine, inactivated 01/15/2013 Given      Td 12/16/2003 Recorded      Td 01/01/1990 Recorded [9/15/2010] month and day unknown

## 2022-02-15 NOTE — TELEPHONE ENCOUNTER
Patients wife lvm @ [phone] asking when patient had his last Tdap vaccine? Called patients cell and ldvm informing him of last Tdap on 7/6/2013 and when he is due for his next in 2023. Asked to call back w/ any questions

## 2022-02-15 NOTE — TELEPHONE ENCOUNTER
Entered by Lina Monzon CMA on December 29, 2020 5:34:59 AM CST  ---------------------  From: Lina Monzon CMA   To: [facility]    Sent: 12/29/2020 5:34:59 AM CST  Subject: Medication Management     ** Submitted: **  Order:atorvastatin (atorvastatin 20 mg oral tablet)  1 tab(s)  Oral  daily  Qty:  30 tab(s)        Refills:  0          VIKTOR     Route To Pharmacy - [facility]    Signed by Lina Monzon CMA  12/29/2020 11:34:00 AM [facility]    ** Submitted: **  Complete:atorvastatin (atorvastatin 20 mg oral tablet)   Signed by Lina Monzon CMA  12/29/2020 11:34:00 AM [facility]    ** Not Approved:  **  atorvastatin (ATORVASTATIN  20MG  TAB)  TAKE 1 TABLET BY MOUTH  DAILY  Qty:  90 tab(s)        Days Supply:  90        Refills:  3          VIKTOR     Route To Pharmacy - [facility]   Note from Pharmacy:  Requesting 1 year supply  Signed by Lina Monzon CMA            ------------------------------------------  From: [facility]  To: Wayne Reddy MD  Sent: December 28, 2020 9:52:11 PM CST  Subject: Medication Management  Due: December 24, 2020 1:58:33 PM CST     ** On Hold Pending Signature **     Drug: atorvastatin (atorvastatin 20 mg oral tablet), TAKE 1 TABLET BY MOUTH DAILY  Quantity: 90 tab(s)  Days Supply: 90  Refills: 3  Substitutions Allowed  Notes from Pharmacy: - First Attempt Ref: [id number]     Dispensed Drug: atorvastatin (atorvastatin 20 mg oral tablet), TAKE 1 TABLET BY MOUTH DAILY  Quantity: 90 tab(s)  Days Supply: 90  Refills: 3  Substitutions Allowed  Notes from Pharmacy: Requesting 1 year supply  ------------------------------------------Pt is due in January for annual px and labs.  #30 given per protocol.  Pt is due for OV and labs prior to further refills.  Message to appointment pool.  Lina Monzon CMA.

## 2022-02-15 NOTE — NURSING NOTE
CAGE Assessment Entered On:  2/1/2019 9:00 AM CST    Performed On:  1/31/2019 9:00 AM CST by Alisa Gross               Assessment   Have you ever felt you should cut down on your drinking :   No   Have people annoyed you by criticizing your drinking :   No   Have you ever felt bad or guilty about your drinking :   No   Have you ever taken a drink first thing in the morning to steady your nerves or get rid of a hangover (Eye-opener) :   No   CAGE Score :   0    Alisa Gross - 2/1/2019 9:00 AM CST

## 2022-02-15 NOTE — NURSING NOTE
Comprehensive Intake Entered On:  1/31/2019 4:07 PM CST    Performed On:  1/31/2019 4:00 PM CST by Shasta Rivera LPN               Summary   Chief Complaint :   Annual Physical    Weight Measured :   152 lb(Converted to: 152 lb 0 oz, 68.95 kg)    Height Measured :   64.5 in(Converted to: 5 ft 4 in, 163.83 cm)    Body Mass Index :   25.68 kg/m2 (HI)    Body Surface Area :   1.77 m2   Systolic Blood Pressure :   112 mmHg   Diastolic Blood Pressure :   68 mmHg   Mean Arterial Pressure :   83 mmHg   Peripheral Pulse Rate :   64 bpm   BP Site :   Right arm   Pulse Site :   Radial artery   BP Method :   Manual   HR Method :   Manual   Temperature Tympanic :   97.7 DegF(Converted to: 36.5 DegC)  (LOW)    Shasta Rivera LPN - 1/31/2019 4:00 PM CST   Health Status   Allergies Verified? :   Yes   Medication History Verified? :   Yes   Immunizations Current :   No   Pre-Visit Planning Status :   Completed   Shasta Rivera LPN - 1/31/2019 4:00 PM CST   Consents   Consent for Immunization Exchange :   Consent Granted   Consent for Immunizations to Providers :   Consent Granted   Shasta Rivera LPN - 1/31/2019 4:00 PM CST   Meds / Allergies   (As Of: 1/31/2019 4:07:24 PM CST)   Allergies (Active)   aspirin  Estimated Onset Date:   Unspecified ; Reactions:   Anaphylaxis ; Created By:   Sade Stewart; Reaction Status:   Active ; Category:   Drug ; Substance:   aspirin ; Type:   Allergy ; Updated By:   Sade Stewart; Source:   Paper Chart ; Reviewed Date:   1/31/2019 4:05 PM CST        Medication List   (As Of: 1/31/2019 4:07:24 PM CST)   Prescription/Discharge Order    atorvastatin  :   atorvastatin ; Status:   Prescribed ; Ordered As Mnemonic:   atorvastatin 20 mg oral tablet ; Simple Display Line:   1 tab(s), Oral, daily, 90 tab(s), 0 Refill(s) ; Ordering Provider:   Wayne Reddy MD; Catalog Code:   atorvastatin ; Order Dt/Tm:   1/14/2019 4:57:56 PM          albuterol  :   albuterol ; Status:   Prescribed  ; Ordered As Mnemonic:   albuterol 90 mcg/inh inhalation aerosol ; Simple Display Line:   See Instructions, 2 puffs inhaled 1-4 times daily prn, PRN: as needed for wheezing, 3 EA, 5 Refill(s) ; Ordering Provider:   Wayne Reddy MD; Catalog Code:   albuterol ; Order Dt/Tm:   2/8/2018 9:58:30 AM          predniSONE  :   predniSONE ; Status:   Prescribed ; Ordered As Mnemonic:   predniSONE 20 mg oral tablet ; Simple Display Line:   20 mg, 1 tab(s), po, bid, PRN: for allergy symptoms, 60 tab(s), 0 Refill(s) ; Ordering Provider:   Wayne Reddy MD; Catalog Code:   predniSONE ; Order Dt/Tm:   2/6/2018 8:25:24 AM            Home Meds    fluticasone  :   fluticasone ; Status:   Documented ; Ordered As Mnemonic:   Flovent HFA 44 mcg/inh inhalation aerosol ; Simple Display Line:   Inhale, bid, 0 Refill(s) ; Catalog Code:   fluticasone ; Order Dt/Tm:   1/31/2019 4:04:13 PM

## 2022-02-15 NOTE — NURSING NOTE
Comprehensive Intake Entered On:  1/21/2020 8:29 AM CST    Performed On:  1/21/2020 8:23 AM CST by Shasta Rivera LPN               Summary   Chief Complaint :   Annual Physical    Weight Measured :   160 lb(Converted to: 160 lb 0 oz, 72.57 kg)    Height Measured :   64.5 in(Converted to: 5 ft 4 in, 163.83 cm)    Body Mass Index :   27.04 kg/m2 (HI)    Body Surface Area :   1.82 m2   Systolic Blood Pressure :   118 mmHg   Diastolic Blood Pressure :   64 mmHg   Mean Arterial Pressure :   82 mmHg   Peripheral Pulse Rate :   62 bpm   BP Site :   Right arm   Pulse Site :   Radial artery   BP Method :   Manual   HR Method :   Manual   Temperature Tympanic :   97.6 DegF(Converted to: 36.4 DegC)  (LOW)    Shasta Rivera LPN - 1/21/2020 8:23 AM CST   Health Status   Allergies Verified? :   Yes   Medication History Verified? :   Yes   Immunizations Current :   No   Pre-Visit Planning Status :   Completed   Shasta Rivera LPN - 1/21/2020 8:23 AM CST   Consents   Consent for Immunization Exchange :   Consent Granted   Consent for Immunizations to Providers :   Consent Granted   Shasta Rivera LPN - 1/21/2020 8:23 AM CST   Meds / Allergies   (As Of: 1/21/2020 8:29:34 AM CST)   Allergies (Active)   aspirin  Estimated Onset Date:   Unspecified ; Reactions:   Anaphylaxis ; Created By:   Sade Stewart; Reaction Status:   Active ; Category:   Drug ; Substance:   aspirin ; Type:   Allergy ; Updated By:   Sade Stewart; Source:   Paper Chart ; Reviewed Date:   1/21/2020 8:27 AM CST        Medication List   (As Of: 1/21/2020 8:29:34 AM CST)   Prescription/Discharge Order    predniSONE 20 mg oral tablet  :   predniSONE 20 mg oral tablet ; Status:   Completed ; Ordered As Mnemonic:   predniSONE 20 mg oral tablet ; Simple Display Line:   See Instructions, TAKE 1 TABLET BY MOUTH 2 DAILY AS NEEDED FOR ALLERGY SYMPTOMS, 60 tab(s) ; Ordering Provider:   Wayne Reddy MD; Catalog Code:   predniSONE ; Order Dt/Tm:    5/31/2019 4:09:36 PM CDT          predniSONE 20 mg oral tablet  :   predniSONE 20 mg oral tablet ; Status:   Prescribed ; Ordered As Mnemonic:   predniSONE 20 mg oral tablet ; Simple Display Line:   See Instructions, TAKE 1 TABLET BY MOUTH 2 DAILY AS NEEDED FOR ALLERGY SYMPTOMS, 60 tab(s) ; Ordering Provider:   Wayne Reddy MD; Catalog Code:   predniSONE ; Order Dt/Tm:   5/31/2019 4:09:36 PM CDT          albuterol  :   albuterol ; Status:   Prescribed ; Ordered As Mnemonic:   albuterol 90 mcg/inh inhalation aerosol ; Simple Display Line:   See Instructions, 2 puffs inhaled 1-4 times daily prn, PRN: as needed for wheezing, 3 EA, 5 Refill(s) ; Ordering Provider:   Wayne Reddy MD; Catalog Code:   albuterol ; Order Dt/Tm:   1/31/2019 4:29:36 PM CST          atorvastatin  :   atorvastatin ; Status:   Prescribed ; Ordered As Mnemonic:   atorvastatin 20 mg oral tablet ; Simple Display Line:   1 tab(s), Oral, daily, 90 tab(s), 3 Refill(s) ; Ordering Provider:   Wayne Reddy MD; Catalog Code:   atorvastatin ; Order Dt/Tm:   1/31/2019 4:28:20 PM CST          fluticasone  :   fluticasone ; Status:   Prescribed ; Ordered As Mnemonic:   Flovent HFA 44 mcg/inh inhalation aerosol ; Simple Display Line:   2 puff(s), Inhale, bid, 1 EA, 3 Refill(s) ; Ordering Provider:   Wayne Reddy MD; Catalog Code:   fluticasone ; Order Dt/Tm:   1/31/2019 4:28:47 PM CST

## 2022-02-15 NOTE — CARE COORDINATION
Pt appears on [facility] chronic disease panel as out of parameters for BP date.  Pt was seen 2/6/2018 BP was 120/78, has RTC 2/7/2019 AWV, fasting labs

## 2022-02-15 NOTE — NURSING NOTE
Depression Screening Entered On:  1/20/2021 9:58 AM CST    Performed On:  1/20/2021 9:58 AM CST by Sade Sarkar MA               Depression Screening   Little Interest - Pleasure in Activities :   Not at all   Feeling Down, Depressed, Hopeless :   Not at all   Initial Depression Screen Score :   0 Score   Poor Appetite or Overeating :   Not at all   Trouble Falling or Staying Asleep :   Not at all   Feeling Tired or Little Energy :   Not at all   Feeling Bad About Yourself :   Not at all   Trouble Concentrating :   Not at all   Moving or Speaking Slowly :   Not at all   Thoughts Better Off Dead or Hurting Self :   Not at all   Detailed Depression Screen Score :   0    Total Depression Screen Score :   0    Sade Sarkar MA - 1/20/2021 9:58 AM CST

## 2022-02-15 NOTE — TELEPHONE ENCOUNTER
---------------------  From: Sade Sarkar MA   To: MIKE FRANCISCO    Sent: 1/22/2020 4:34:02 PM CST  Subject: Lab results from 1/21/2020     Mike Anaya,    Here are your lab results from 1/21/2020:       Cholesterol:  167       <200mg/dL     HDL:             57        >40mg/dL     LDL:             94             Triglycerides: 71        <150mg/dL     Glucose:       100       65-99mg/dL     If you have any further concerns, please let us know.    Sincerely,       Sade BUTTERFIELD CMA/Dr. Reddy

## 2022-02-17 ENCOUNTER — COMMUNICATION - RIVER FALLS (OUTPATIENT)
Dept: FAMILY MEDICINE | Facility: CLINIC | Age: 67
End: 2022-02-17

## 2022-03-02 VITALS
HEART RATE: 75 BPM | WEIGHT: 161.1 LBS | TEMPERATURE: 97.9 F | BODY MASS INDEX: 26.84 KG/M2 | HEIGHT: 65 IN | SYSTOLIC BLOOD PRESSURE: 127 MMHG | DIASTOLIC BLOOD PRESSURE: 71 MMHG

## 2022-03-02 NOTE — NURSING NOTE
Hearing and Vision Screening Entered On:  2/14/2022 9:18 AM CST    Performed On:  2/14/2022 9:18 AM CST by Sade Sarkar MA               Hearing and Vision Screening   Audiogram Result Right Ear :   Pass   Audiogram Result Left Ear :   Pass   Hearing Screen Comments :   Pt wears bilateral hearing aids   Sade Sarkar MA - 2/14/2022 9:18 AM CST

## 2022-03-02 NOTE — LETTER
(Inserted Image. Unable to display)   January 24, 2022  VIPIN FRANCISCO  [address] [place] [address]        Dear VIPIN,    Thank you for selecting [facility] for your healthcare needs.    Our records indicate you are due for the following services:     Annual Physical     (FYI   Regarding office visits: In some instances, a video visit or telephone visit may be offered as an option.)    To schedule an appointment or if you have further questions, please contact your clinic at [phone].    Powered by [employer]    Sincerely,    Wayne Reddy MD

## 2022-03-02 NOTE — PROGRESS NOTES
Chief Complaint    annual px  History of Present Illness      General health status:  good      Diet:  regular      Exercise:  several times a week      Medical encounters:  none      Dental exam:  up to date      Eye exam:  up to date      Caffeine use:  daily      Tobacco use:  no      Alcohol use:  weekly      Lipid and diabetes screening:  due      Colon cancer screening:  due 2030      Prostate cancer screening:  last year      Immunizations:  up to date      Other concerns:  increased frequency of BM, recent mild cough      Chronic disease:  HLD and allergic rhinitis controlled         Review of Systems          Constitutional:  No fever, No chills, No sweats, No weakness, No fatigue.            Eye:  No recent visual problem.            Ear/Nose/Mouth/Throat:  No decreased hearing, No nasal congestion, No sore throat.            Respiratory:  No shortness of breath, No cough.            Cardiovascular:  Negative, No chest pain, No palpitations, No peripheral edema.            Gastrointestinal:  No nausea, No vomiting, No diarrhea, No constipation, No heartburn.            Genitourinary:  No dysuria, No change in urine stream.            Hematology/Lymphatics:  No bruising tendency, No bleeding tendency.            Endocrine:  No cold intolerance, No heat intolerance.            Immunologic:  Negative.            Musculoskeletal:  No back pain, No neck pain, No joint pain, No muscle pain.            Integumentary:  No rash, No dryness, No skin lesion.            Neurologic:  Alert and oriented X4, No headache.                Psychiatric:  No anxiety, No depression  Physical Exam   Vitals & Measurements    T: 97.9  F (Tympanic)  HR: 75 (Peripheral)  BP: 127/71     HT: 64.5 in  WT: 161.1 lb  BMI: 27.22           General:  Alert and oriented, No acute distress.            Eye:  Pupils are equal, round and reactive to light, Extraocular movements are intact, Normal conjunctiva.            HENT:  Normocephalic,  Tympanic membranes are clear, Oral mucosa is moist, No pharyngeal erythema, No sinus tenderness.            Neck:  Supple, Non-tender, No carotid bruit, No lymphadenopathy, No thyromegaly.            Respiratory:  Lungs are clear to auscultation, Respirations are non-labored, Breath sounds are equal, No chest wall tenderness.            Cardiovascular:  Normal rate, Regular rhythm, No murmur, No gallop, Good pulses equal in all extremities, Normal peripheral perfusion, No edema.            Gastrointestinal:  Soft, Non-tender, Non-distended, Normal bowel sounds, No organomegaly.            Genitourinary:  No CVA tenderness           Lymphatics:  WNL.            Musculoskeletal:  Normal range of motion, Normal strength, No tenderness, No swelling, No deformity.            Integumentary:  Warm, Dry, No rash.            Neurologic:  Alert, Oriented, Normal sensory, Normal motor function, No focal deficits.            Psychiatric:  Cooperative, Appropriate mood & affect.   Assessment/Plan       1. Annual physical exam (Z00.00)         Discussed diet, weight management, BMI, activity and exercise        Follow up in one year       2. HLD (hyperlipidemia) (E78.5)        controlled, labs today, continue current medication       3. Allergic rhinitis (J30.9)         controlled, continue current medication       Orders:         albuterol, 1 puff(s), Inhale, q4 hrs, PRN: as needed for wheezing, # 1 EA, 5 Refill(s), Type: Hard Stop, resending with direction clarification, (Completed)         albuterol, 1 puff(s), Inhale, q4 hrs, PRN: as needed for wheezing, # 1 EA, 5 Refill(s), Type: Maintenance, Pharmacy: [facility], 1 puff(s) Inhale q4 hrs,PRN:as needed for wheezing, 64.5, in, 02/14/22 9:13:00 CST, Height Measured, 161.1, lb, 02/14/22 9:..., (Ordered)         atorvastatin, = 1 tab(s), Oral, daily, # 30 tab(s), 0 Refill(s), VIKTOR, Type: Hard Stop, Pharmacy: [facility], Pt needs to be seen for any further  refills per [facility]--last seen Jan 2021 for check up, 64.5, in, 01/20/21 7:09:00 CST, Height Measured, 160.4, lb,..., (Completed)         atorvastatin, = 1 tab(s), Oral, daily, # 30 tab(s), 0 Refill(s), VIKTOR, Type: Maintenance, Pharmacy: [facility], 1 tab(s) Oral daily, 64.5, in, 02/14/22 9:13:00 CST, Height Measured, 161.1, lb, 02/14/22 9:13:00 CST, Weight Measured, (Ordered)         fluticasone, = 2 puff(s), Inhale, bid, # 1 EA, 3 Refill(s), Type: Hard Stop, (Completed)         fluticasone, = 2 puff(s), Inhale, bid, # 1 EA, 3 Refill(s), Type: Maintenance, Pharmacy: [facility], 2 puff(s) Inhale bid, 64.5, in, 02/14/22 9:13:00 CST, Height Measured, 161.1, lb, 02/14/22 9:13:00 CST, Weight Measured, (Ordered)         predniSONE, = 2 tab(s) ( 40 mg ), Oral, daily, # 14 tab(s), 1 Refill(s), Type: Soft Stop, (Completed)  Patient Information     Name:VIPIN FRANCISCO A      Address:      [address] [place] [id number]     Sex:Male     YOB: 1955     [phone]     Emergency Contact:ROHINI FRANCISCO     MRN:[id number]     FIN:[id number]     Location:[facility]     Date of Service:02/14/2022      Primary Care Physician:       Wayne Reddy MD, [phone]      Attending Physician:       Wayne Reddy MD, [phone]  Problem List/Past Medical History    Ongoing     Agatston coronary artery calcium score less than 100     Allergic rhinitis       Comments: Outside Source Comment: In the spring.     HLD (hyperlipidemia)     Left hydrocele     Prediabetes     Right inguinal hernia     Right shoulder tendonitis     Seasonal allergies       Comments: In the spring.    Historical     No qualifying data  Procedure/Surgical History     Colonoscopy (02/07/2020)      Comments: Indication:  screening      Sedation:  MAC      Findings:  no polyps/abnormalities seen      Recommendation:  f/u 10yrs.     Cardiac computed tomography for calcium scoring (11/09/2016)       Comments: Total Agatston calcium score is 27.     Left hydrocelectomy/ right spermatocelectomy (06/29/2011)     Colonoscopy (05/04/2009)      Comments: Indication:  screening      Sedation:  IV      Findings:  no polyps seen      Recommendation:  f/u 10yrs.     Right shoulder superolabral debridement; arthroscopic subacromial decompression (05/27/2003)     Bladder surgery/TURP (1998)     Right hip surgery (1974)     Tonsillectomy and adenoidectomy (1962)  Medications    albuterol 90 mcg/inh inhalation aerosol, 1 puff(s), Inhale, q4 hrs, PRN, 5 refills    atorvastatin 20 mg oral tablet, 1 tab(s), Oral, daily    Flovent HFA 44 mcg/inh inhalation aerosol, 2 puff(s), Inhale, bid, 3 refills  Allergies    aspirin (Anaphylaxis)  Social History    Smoking Status     Never smoker     Alcohol      Current, 5 times per week, 2 drinks/episode average.     Electronic Cigarette/Vaping      Electronic Cigarette Use: Never.     Employment/School      Work/School description: [employer]or/[employer].     Exercise      Exercise frequency: Daily. Exercise type: manual work.     Home/Environment      Marital status: . Spouse/Partner name: Izabella Damon. Risks in environment: owns secured gun.     Nutrition/Health      Type of diet: Regular.     Sexual      Sexually active: Yes. Identifies as male, Sexual orientation: Straight or heterosexual.     Substance Abuse      Never     Tobacco      Never (less than 100 in lifetime)  Family History    Alzheimer's disease: Mother.    CA - Cancer of prostate: Father and Brother.    Coronary artery disease: Father.    Diabetes mellitus type 2: Father.    High cholesterol: Father.    Myocardial infarction: Grandfather (M) and Grandfather (P).    Sister: History is negative    Brother: History is negative    Sister: History is negative  Immunizations       Scheduled Immunizations       Dose Date(s)       influenza virus vaccine, inactivated       01/15/2013, 12/16/2021       SARS-CoV-2 (COVID-19)  Perfecto-1273       12/16/2021       Other Immunizations               influenza virus vaccine, inactivated       11/18/2014, 11/17/2015, 11/03/2016, 02/06/2018, 12/20/2018, 12/18/2019, 12/03/2020       Td       01/01/1990, 12/16/2003       tetanus/diphth/pertuss (Tdap) adult/adol       07/10/2013, 10/24/2021       SARS-CoV-2 (COVID-19) Moderna-1273       04/08/2021, 05/06/2021

## 2022-03-02 NOTE — NURSING NOTE
Comprehensive Intake Entered On:  2/14/2022 9:18 AM CST    Performed On:  2/14/2022 9:13 AM CST by Sade Sarkar MA               Summary   Chief Complaint :   annual px   Weight Measured :   161.1 lb(Converted to: 161 lb 2 oz, 73.074 kg)    Height Measured :   64.5 in(Converted to: 5 ft 4 in, 163.83 cm)    Body Mass Index :   27.22 kg/m2 (HI)    Body Surface Area :   1.82 m2   Systolic Blood Pressure :   127 mmHg   Diastolic Blood Pressure :   71 mmHg   Mean Arterial Pressure :   90 mmHg   Peripheral Pulse Rate :   75 bpm   BP Site :   Right arm   Pulse Site :   Brachial artery   BP Method :   Electronic   HR Method :   Electronic   Temperature Tympanic :   97.9 DegF(Converted to: 36.6 DegC)    Sade Sarkar MA - 2/14/2022 9:13 AM CST   Health Status   Allergies Verified? :   Yes   Medication History Verified? :   Yes   Immunizations Current :   No   Medical History Verified? :   Yes   Pre-Visit Planning Status :   Completed   Tobacco Use? :   Never smoker   Sade Sarkar MA - 2/14/2022 9:13 AM CST   Consents   Consent for Immunization Exchange :   Consent Granted   Consent for Immunizations to Providers :   Consent Granted   Sade Sarkar MA - 2/14/2022 9:13 AM CST   Meds / Allergies   (As Of: 2/14/2022 9:18:06 AM CST)   Allergies (Active)   aspirin  Estimated Onset Date:   Unspecified ; Reactions:   Anaphylaxis ; Created By:   Sade Stewart; Reaction Status:   Active ; Category:   Drug ; Substance:   aspirin ; Type:   Allergy ; Updated By:   Sade Stewart; Source:   Paper Chart ; Reviewed Date:   1/21/2020 9:45 AM CST        Medication List   (As Of: 2/14/2022 9:18:06 AM CST)   Prescription/Discharge Order    albuterol  :   albuterol ; Status:   Prescribed ; Ordered As Mnemonic:   albuterol 90 mcg/inh inhalation aerosol ; Simple Display Line:   1 puff(s), Inhale, q4 hrs, PRN: as needed for wheezing, 1 EA, 5 Refill(s) ; Ordering Provider:   Wayne Reddy MD; Catalog Code:   albuterol ; Order Dt/Tm:    1/20/2021 7:53:48 AM CST          atorvastatin  :   atorvastatin ; Status:   Prescribed ; Ordered As Mnemonic:   atorvastatin 20 mg oral tablet ; Simple Display Line:   1 tab(s), Oral, daily, 30 tab(s), 0 Refill(s) ; Ordering Provider:   Wayne Reddy MD; Catalog Code:   atorvastatin ; Order Dt/Tm:   12/6/2021 9:16:40 AM CST          fluticasone  :   fluticasone ; Status:   Prescribed ; Ordered As Mnemonic:   Flovent HFA 44 mcg/inh inhalation aerosol ; Simple Display Line:   2 puff(s), Inhale, bid, 1 EA, 3 Refill(s) ; Ordering Provider:   Wayne Reddy MD; Catalog Code:   fluticasone ; Order Dt/Tm:   1/20/2021 7:53:55 AM CST          predniSONE  :   predniSONE ; Status:   Prescribed ; Ordered As Mnemonic:   predniSONE 20 mg oral tablet ; Simple Display Line:   40 mg, 2 tab(s), Oral, daily, for 7 day(s), 14 tab(s), 1 Refill(s) ; Ordering Provider:   Wayne Reddy MD; Catalog Code:   predniSONE ; Order Dt/Tm:   1/20/2021 7:54:04 AM CST            Social History   Social History   (As Of: 2/14/2022 9:18:06 AM CST)   Alcohol:        Current, 5 times per week, 2 drinks/episode average.   (Last Updated: 2/1/2019 8:59:48 AM CST by Alisa Gross)          Tobacco:        Never (less than 100 in lifetime)   (Last Updated: 2/14/2022 9:13:57 AM CST by Sade Sarkar MA)          Electronic Cigarette/Vaping:        Electronic Cigarette Use: Never.   (Last Updated: 2/14/2022 9:13:59 AM CST by Sade Sarkar MA)          Substance Abuse:        Never   (Last Updated: 11/2/2016 7:57:41 PM CDT by Brandy Liu CMA)          Employment/School:        Work/School description: [employer]or/[employer].   (Last Updated: 9/13/2012 8:50:51 PM CDT by Tisha Thapa CMA)          Home/Environment:        Marital status: .  Spouse/Partner name: Izabella Damon.  Risks in environment: owns secured gun.   (Last Updated: 6/16/2017 1:47:05 PM CDT by Cinthia Carroll)          Nutrition/Health:        Type of diet:  Regular.   (Last Updated: 9/13/2012 8:47:28 PM CDT by Tisha Thapa CMA)          Exercise:        Exercise frequency: Daily.  Exercise type: manual work.   (Last Updated: 2/6/2018 9:20:12 AM CST by Sade Sarkar MA)          Sexual:        Sexually active: Yes.  Identifies as male, Sexual orientation: Straight or heterosexual.   (Last Updated: 2/1/2019 1:23:15 PM CST by Cinthia Carroll)

## 2022-03-02 NOTE — TELEPHONE ENCOUNTER
---------------------  From: Wayne Reddy MD   To: VIPIN FRANCISCO    Sent: 2/17/2022 11:48:26 AM CST  Subject: Patient Message - Results Notification     Mati,    Your recent blood tests all look good.     Dr. LEON

## 2022-03-02 NOTE — TELEPHONE ENCOUNTER
---------------------  From: Joanna Aranda RN (Phone Messages Pool ([id number]_[facility]))   To: MIKE FRANCISCO    Sent: 2/17/2022 2:00:34 PM CST  Subject: FW: blood test rersults     Mike Wilhelm,     You will find your lab results attached to this message.     Thank you,  Joanna HYATT RN      ---------------------  From: MIKE FRANCISCO  To: [facility]  Sent: 02/17/2022 12:02 p.m. CST  Subject: blood test rersults  Can I get a copy of my blood test results

## 2022-04-07 NOTE — TELEPHONE ENCOUNTER
Fax for Refill Request    [facility] requesting Prednisone.    Medication is not on med list or [facility] med list.    dermatitis

## 2022-05-02 ENCOUNTER — TELEPHONE (OUTPATIENT)
Dept: FAMILY MEDICINE | Facility: CLINIC | Age: 67
End: 2022-05-02

## 2022-05-02 DIAGNOSIS — J45.20 MILD INTERMITTENT ASTHMA, UNSPECIFIED WHETHER COMPLICATED: Primary | ICD-10-CM

## 2022-05-02 NOTE — TELEPHONE ENCOUNTER
Reason for Call:  Medication or medication refill:    Do you use a [facility]?  Name of the pharmacy and phone number for the current request: [facility]    Name of the medication requested: Prednisone    Other request: Wondering why it was denied by [facility].  Patient would like a refill.    Can we leave a detailed message on this number? YES    Phone number patient can be reached at: Cell number on file:    Telephone Information:   Mobile [phone]       Best Time: anytime    Call taken on 5/2/2022 at 3:49 PM by NOMAN OROZCO

## 2022-05-03 DIAGNOSIS — J45.20 MILD INTERMITTENT ASTHMA, UNSPECIFIED WHETHER COMPLICATED: Primary | ICD-10-CM

## 2022-05-03 RX ORDER — ALBUTEROL SULFATE 90 UG/1
2 AEROSOL, METERED RESPIRATORY (INHALATION) EVERY 6 HOURS PRN
COMMUNITY
Start: 2021-01-20 | End: 2023-02-21

## 2022-05-03 RX ORDER — PREDNISONE 20 MG/1
40 TABLET ORAL DAILY
Qty: 14 TABLET | Refills: 1 | Status: SHIPPED | OUTPATIENT
Start: 2022-05-03 | End: 2023-12-19

## 2022-05-04 RX ORDER — PREDNISONE 20 MG/1
40 TABLET ORAL DAILY
Qty: 14 TABLET | Refills: 0 | Status: SHIPPED | OUTPATIENT
Start: 2022-05-04 | End: 2022-05-11

## 2022-10-10 ENCOUNTER — TRANSFERRED RECORDS (OUTPATIENT)
Dept: HEALTH INFORMATION MANAGEMENT | Facility: CLINIC | Age: 67
End: 2022-10-10

## 2022-10-21 ENCOUNTER — TRANSFERRED RECORDS (OUTPATIENT)
Dept: HEALTH INFORMATION MANAGEMENT | Facility: CLINIC | Age: 67
End: 2022-10-21

## 2022-11-07 ENCOUNTER — TRANSFERRED RECORDS (OUTPATIENT)
Dept: HEALTH INFORMATION MANAGEMENT | Facility: CLINIC | Age: 67
End: 2022-11-07

## 2022-12-21 ENCOUNTER — ALLIED HEALTH/NURSE VISIT (OUTPATIENT)
Dept: FAMILY MEDICINE | Facility: CLINIC | Age: 67
End: 2022-12-21
Payer: COMMERCIAL

## 2022-12-21 ENCOUNTER — TRANSFERRED RECORDS (OUTPATIENT)
Dept: HEALTH INFORMATION MANAGEMENT | Facility: CLINIC | Age: 67
End: 2022-12-21

## 2022-12-21 DIAGNOSIS — Z23 NEED FOR VACCINATION: Primary | ICD-10-CM

## 2022-12-21 PROCEDURE — 99207 PR NO CHARGE NURSE ONLY: CPT

## 2022-12-21 PROCEDURE — G0008 ADMIN INFLUENZA VIRUS VAC: HCPCS | Mod: 59

## 2022-12-21 PROCEDURE — 0134A COVID-19 VACCINE BIVALENT BOOSTER 18+ (MODERNA): CPT

## 2022-12-21 PROCEDURE — 90662 IIV NO PRSV INCREASED AG IM: CPT

## 2022-12-21 PROCEDURE — 91313 COVID-19 VACCINE BIVALENT BOOSTER 18+ (MODERNA): CPT

## 2023-02-15 ASSESSMENT — ASTHMA QUESTIONNAIRES
QUESTION_3 LAST FOUR WEEKS HOW OFTEN DID YOUR ASTHMA SYMPTOMS (WHEEZING, COUGHING, SHORTNESS OF BREATH, CHEST TIGHTNESS OR PAIN) WAKE YOU UP AT NIGHT OR EARLIER THAN USUAL IN THE MORNING: FOUR OR MORE NIGHTS A WEEK
ACT_TOTALSCORE: 10
QUESTION_5 LAST FOUR WEEKS HOW WOULD YOU RATE YOUR ASTHMA CONTROL: POORLY CONTROLLED
ACT_TOTALSCORE: 10
QUESTION_2 LAST FOUR WEEKS HOW OFTEN HAVE YOU HAD SHORTNESS OF BREATH: MORE THAN ONCE A DAY
QUESTION_1 LAST FOUR WEEKS HOW MUCH OF THE TIME DID YOUR ASTHMA KEEP YOU FROM GETTING AS MUCH DONE AT WORK, SCHOOL OR AT HOME: NONE OF THE TIME
QUESTION_4 LAST FOUR WEEKS HOW OFTEN HAVE YOU USED YOUR RESCUE INHALER OR NEBULIZER MEDICATION (SUCH AS ALBUTEROL): THREE OR MORE TIMES PER DAY

## 2023-02-15 ASSESSMENT — ENCOUNTER SYMPTOMS
COUGH: 1
HEADACHES: 0
SORE THROAT: 0
CONSTIPATION: 0
JOINT SWELLING: 0
PALPITATIONS: 0
ABDOMINAL PAIN: 0
FEVER: 0
HEARTBURN: 1
MYALGIAS: 0
WEAKNESS: 0
HEMATURIA: 0
DIARRHEA: 1
NAUSEA: 0
NERVOUS/ANXIOUS: 0
DYSURIA: 0
DIZZINESS: 0
EYE PAIN: 0
FREQUENCY: 0
PARESTHESIAS: 0
ARTHRALGIAS: 1
SHORTNESS OF BREATH: 1
HEMATOCHEZIA: 0
CHILLS: 0

## 2023-02-15 ASSESSMENT — ACTIVITIES OF DAILY LIVING (ADL): CURRENT_FUNCTION: NO ASSISTANCE NEEDED

## 2023-02-20 PROBLEM — K40.90 RIGHT INGUINAL HERNIA: Status: ACTIVE | Noted: 2023-02-20

## 2023-02-20 PROBLEM — R06.02 SOB (SHORTNESS OF BREATH): Status: ACTIVE | Noted: 2022-11-20

## 2023-02-20 PROBLEM — J30.2 SEASONAL ALLERGIC RHINITIS: Status: ACTIVE | Noted: 2023-02-20

## 2023-02-20 PROBLEM — R73.03 PREDIABETES: Status: ACTIVE | Noted: 2023-02-20

## 2023-02-20 PROBLEM — E78.5 HYPERLIPIDEMIA: Status: ACTIVE | Noted: 2023-02-20

## 2023-02-20 RX ORDER — ATORVASTATIN CALCIUM 20 MG/1
20 TABLET, FILM COATED ORAL DAILY
COMMUNITY
Start: 2021-10-24 | End: 2023-02-21

## 2023-02-21 ENCOUNTER — OFFICE VISIT (OUTPATIENT)
Dept: FAMILY MEDICINE | Facility: CLINIC | Age: 68
End: 2023-02-21
Payer: COMMERCIAL

## 2023-02-21 VITALS
TEMPERATURE: 98.3 F | SYSTOLIC BLOOD PRESSURE: 113 MMHG | OXYGEN SATURATION: 100 % | BODY MASS INDEX: 25.99 KG/M2 | DIASTOLIC BLOOD PRESSURE: 74 MMHG | WEIGHT: 156 LBS | HEART RATE: 65 BPM | HEIGHT: 65 IN | RESPIRATION RATE: 16 BRPM

## 2023-02-21 DIAGNOSIS — R73.03 PREDIABETES: ICD-10-CM

## 2023-02-21 DIAGNOSIS — R05.3 CHRONIC COUGH: ICD-10-CM

## 2023-02-21 DIAGNOSIS — Z13.1 SCREENING FOR DIABETES MELLITUS: ICD-10-CM

## 2023-02-21 DIAGNOSIS — E78.2 MIXED HYPERLIPIDEMIA: ICD-10-CM

## 2023-02-21 DIAGNOSIS — Z12.5 SPECIAL SCREENING FOR MALIGNANT NEOPLASM OF PROSTATE: ICD-10-CM

## 2023-02-21 DIAGNOSIS — Z00.00 ENCOUNTER FOR MEDICARE ANNUAL WELLNESS EXAM: Primary | ICD-10-CM

## 2023-02-21 LAB
CHOLEST SERPL-MCNC: 200 MG/DL
FASTING STATUS PATIENT QL REPORTED: ABNORMAL
GLUCOSE SERPL-MCNC: 107 MG/DL (ref 70–99)
HDLC SERPL-MCNC: 63 MG/DL
LDLC SERPL CALC-MCNC: 123 MG/DL
NONHDLC SERPL-MCNC: 137 MG/DL
PSA SERPL-MCNC: 1.23 NG/ML (ref 0–4.5)
TRIGL SERPL-MCNC: 68 MG/DL

## 2023-02-21 PROCEDURE — G0009 ADMIN PNEUMOCOCCAL VACCINE: HCPCS | Performed by: FAMILY MEDICINE

## 2023-02-21 PROCEDURE — 36415 COLL VENOUS BLD VENIPUNCTURE: CPT | Performed by: FAMILY MEDICINE

## 2023-02-21 PROCEDURE — 80061 LIPID PANEL: CPT | Performed by: FAMILY MEDICINE

## 2023-02-21 PROCEDURE — G0402 INITIAL PREVENTIVE EXAM: HCPCS | Performed by: FAMILY MEDICINE

## 2023-02-21 PROCEDURE — 90677 PCV20 VACCINE IM: CPT | Performed by: FAMILY MEDICINE

## 2023-02-21 PROCEDURE — 82947 ASSAY GLUCOSE BLOOD QUANT: CPT | Mod: QW | Performed by: FAMILY MEDICINE

## 2023-02-21 PROCEDURE — 99214 OFFICE O/P EST MOD 30 MIN: CPT | Mod: 25 | Performed by: FAMILY MEDICINE

## 2023-02-21 PROCEDURE — G0103 PSA SCREENING: HCPCS | Performed by: FAMILY MEDICINE

## 2023-02-21 RX ORDER — ALBUTEROL SULFATE 90 UG/1
2 AEROSOL, METERED RESPIRATORY (INHALATION) EVERY 4 HOURS PRN
Qty: 18 G | Refills: 5 | Status: SHIPPED | OUTPATIENT
Start: 2023-02-21 | End: 2023-02-21

## 2023-02-21 RX ORDER — FLUTICASONE PROPIONATE 110 UG/1
1 AEROSOL, METERED RESPIRATORY (INHALATION) 2 TIMES DAILY PRN
COMMUNITY
End: 2023-02-21

## 2023-02-21 RX ORDER — FLUTICASONE PROPIONATE 110 UG/1
2 AEROSOL, METERED RESPIRATORY (INHALATION) 2 TIMES DAILY
Qty: 12 G | Refills: 5 | Status: SHIPPED | OUTPATIENT
Start: 2023-02-21 | End: 2023-03-20

## 2023-02-21 RX ORDER — ATORVASTATIN CALCIUM 20 MG/1
20 TABLET, FILM COATED ORAL DAILY
Qty: 90 TABLET | Refills: 3 | Status: SHIPPED | OUTPATIENT
Start: 2023-02-21 | End: 2023-02-21

## 2023-02-21 RX ORDER — ALBUTEROL SULFATE 90 UG/1
2 AEROSOL, METERED RESPIRATORY (INHALATION) EVERY 4 HOURS PRN
Qty: 18 G | Refills: 5 | Status: SHIPPED | OUTPATIENT
Start: 2023-02-21 | End: 2024-05-28

## 2023-02-21 RX ORDER — ATORVASTATIN CALCIUM 20 MG/1
20 TABLET, FILM COATED ORAL DAILY
Qty: 90 TABLET | Refills: 3 | Status: SHIPPED | OUTPATIENT
Start: 2023-02-21 | End: 2024-03-11

## 2023-02-21 RX ORDER — FLUTICASONE PROPIONATE 110 UG/1
2 AEROSOL, METERED RESPIRATORY (INHALATION) 2 TIMES DAILY
Qty: 12 G | Refills: 5 | Status: SHIPPED | OUTPATIENT
Start: 2023-02-21 | End: 2023-02-21

## 2023-02-21 ASSESSMENT — ENCOUNTER SYMPTOMS
HEARTBURN: 1
DIZZINESS: 0
NAUSEA: 0
DIARRHEA: 1
SHORTNESS OF BREATH: 1
NERVOUS/ANXIOUS: 0
EYE PAIN: 0
DYSURIA: 0
SORE THROAT: 0
HEMATURIA: 0
COUGH: 1
HEADACHES: 0
CONSTIPATION: 0
MYALGIAS: 0
PARESTHESIAS: 0
ABDOMINAL PAIN: 0
CHILLS: 0
FREQUENCY: 0
FEVER: 0
JOINT SWELLING: 0
PALPITATIONS: 0
WEAKNESS: 0
HEMATOCHEZIA: 0
ARTHRALGIAS: 1

## 2023-02-21 ASSESSMENT — ACTIVITIES OF DAILY LIVING (ADL): CURRENT_FUNCTION: NO ASSISTANCE NEEDED

## 2023-02-21 NOTE — PROGRESS NOTES
"SUBJECTIVE:   Mike is a 67 year old who presents for Preventive Visit.    Patient has been advised of split billing requirements and indicates understanding: Yes  Are you in the first 12 months of your Medicare coverage?  Yes,  Patient had recent eye exam last fall with optometrist  Vision testing:  right eye 20/50, left eye 20/30, each eye 20/30    Healthy Habits:     In general, how would you rate your overall health?  Excellent    Frequency of exercise:  2-3 days/week    Duration of exercise:  15-30 minutes    Do you usually eat at least 4 servings of fruit and vegetables a day, include whole grains    & fiber and avoid regularly eating high fat or \"junk\" foods?  No    Taking medications regularly:  Yes    Medication side effects:  Not applicable    Ability to successfully perform activities of daily living:  No assistance needed    Home Safety:  No safety concerns identified    Hearing Impairment:  Difficulty following a conversation in a noisy restaurant or crowded room, feel that people are mumbling or not speaking clearly, difficulty following dialogue in the theater, difficult to understand a speaker at a public meeting or Jew service, need to ask people to speak up or repeat themselves and difficulty understanding soft or whispered speech    In the past 6 months, have you been bothered by leaking of urine?  No    In general, how would you rate your overall mental or emotional health?  Excellent      PHQ-2 Total Score: 0    Additional concerns today:  No    He has been wheezing and coughing since late fall.  Current inhalers do not seem to be controlling symptoms.  They started when he started using his indoor fireplace.  He has noticed a slight decrease in activity tolerance.  He has allergic rhinitis that has responded to intermittent use of prednisone.    Have you ever done Advance Care Planning? (For example, a Health Directive, POLST, or a discussion with a medical provider or your loved ones " about your wishes): No, advance care planning information given to patient to review.  Patient declined advance care planning discussion at this time.       Fall risk  Fallen 2 or more times in the past year?: No  Any fall with injury in the past year?: No    Cognitive Screening   1) Repeat 3 items Banana, Sunrise, Chair  2) Clock draw: wrongn hand placement  3) 3 item recall: Recalls 3 objects  Results: abnormal clock, 3 objects recalled    Mini-CogTM Copyright HEMANT Denson. Licensed by the author for use in [facility]; reprinted with permission ([email or web address]). All rights reserved.          Reviewed and updated as needed this visit by clinical staff   Tobacco  Allergies  Meds              Reviewed and updated as needed this visit by Provider                 Social History     Tobacco Use     Smoking status: Never     Smokeless tobacco: Never   Substance Use Topics     Alcohol use: Yes     If you drink alcohol do you typically have >3 drinks per day or >7 drinks per week? No    Alcohol Use 2/15/2023   Prescreen: >3 drinks/day or >7 drinks/week? No   No flowsheet data found.    Discuss Flovent prescription.    Current providers sharing in care for this patient include:   Patient Care Team:  Wayne Reddy MD as PCP - General (Family Medicine)  Wayne Reddy MD as Assigned PCP    The following health maintenance items are reviewed in Epic and correct as of today:  Health Maintenance   Topic Date Due     ANNUAL REVIEW OF  ORDERS  Never done     ASTHMA ACTION PLAN  Never done     ADVANCE CARE PLANNING  Never done     ZOSTER IMMUNIZATION (1 of 2) Never done     LIPID  02/01/2020     MEDICARE ANNUAL WELLNESS VISIT  Never done     Pneumococcal Vaccine: 65+ Years (1 - PCV) Never done     AORTIC ANEURYSM SCREENING (SYSTEM ASSIGNED)  Never done     HEPATITIS C SCREENING  02/21/2028 (Originally 8/3/1973)     HIV SCREENING  02/21/2028 (Originally 8/3/1970)     ASTHMA CONTROL TEST  08/21/2023     FALL  "RISK ASSESSMENT  02/21/2024     COLORECTAL CANCER SCREENING  02/07/2030     DTAP/TDAP/TD IMMUNIZATION (3 - Td or Tdap) 10/24/2031     PHQ-2 (once per calendar year)  Completed     INFLUENZA VACCINE  Completed     COVID-19 Vaccine  Completed     IPV IMMUNIZATION  Aged Out     MENINGITIS IMMUNIZATION  Aged Out     Lab work is in process  Labs reviewed in EPIC          Review of Systems   Constitutional: Negative for chills and fever.   HENT: Positive for congestion and hearing loss. Negative for ear pain and sore throat.    Eyes: Negative for pain and visual disturbance.   Respiratory: Positive for cough and shortness of breath.    Cardiovascular: Negative for chest pain, palpitations and peripheral edema.   Gastrointestinal: Positive for diarrhea and heartburn. Negative for abdominal pain, constipation, hematochezia and nausea.   Genitourinary: Positive for urgency. Negative for dysuria, frequency, genital sores, hematuria, impotence and penile discharge.   Musculoskeletal: Positive for arthralgias. Negative for joint swelling and myalgias.   Skin: Negative for rash.   Neurological: Negative for dizziness, weakness, headaches and paresthesias.   Psychiatric/Behavioral: Negative for mood changes. The patient is not nervous/anxious.          OBJECTIVE:   /74 (BP Location: Right arm, Patient Position: Sitting, Cuff Size: Adult Regular)   Pulse 65   Temp 98.3  F (36.8  C) (Tympanic)   Resp 16   Ht 1.651 m (5' 5\")   Wt 70.8 kg (156 lb)   SpO2 100%   BMI 25.96 kg/m   Estimated body mass index is 25.96 kg/m  as calculated from the following:    Height as of this encounter: 1.651 m (5' 5\").    Weight as of this encounter: 70.8 kg (156 lb).  Physical Exam  GENERAL: healthy, alert and no distress  EYES: Eyes grossly normal to inspection, PERRL and conjunctivae and sclerae normal  HENT: ear canals and TM's normal, nose and mouth without ulcers or lesions  NECK: no adenopathy, no asymmetry, masses, or scars and " thyroid normal to palpation  RESP: lungs clear to auscultation - no rales, rhonchi or wheezes  CV: regular rate and rhythm, normal S1 S2, no S3 or S4, no murmur, click or rub, no peripheral edema and peripheral pulses strong  ABDOMEN: soft, nontender, no hepatosplenomegaly, no masses and bowel sounds normal  MS: no gross musculoskeletal defects noted, no edema  NEURO: Normal strength and tone, mentation intact and speech normal  PSYCH: mentation appears normal, affect normal/bright    Diagnostic Test Results:  Labs reviewed in Epic    ASSESSMENT / PLAN:   (Z00.00) Encounter for Medicare annual wellness exam  (primary encounter diagnosis)  Comment: Doing well  Plan: Discussed health maintenance, appropriate diet, heart disease risk    (E78.2) Mixed hyperlipidemia  Comment: LDL at goal, history of coronary calcium score of 27  Plan: Lipid panel reflex to direct LDL Non-fasting,         atorvastatin (LIPITOR) 20 MG tablet,         Check lipids today and continue current dose of atorvastatin.  Adjust as needed pending results    (R73.03) Prediabetes  Comment: History of mildly elevated A1c  Plan: Glucose        Check glucose today    (R05.3) Chronic cough  Comment: Chronic cough that is improving once he resumed fluticasone inhaler.  Plan: fluticasone (FLOVENT HFA) 110 MCG/ACT inhaler,         albuterol (PROAIR HFA/PROVENTIL HFA/VENTOLIN         HFA) 108 (90 Base) MCG/ACT inhaler,         He will continue with 2 puffs twice daily for the next 4 to 6 weeks and wean after that.  If symptoms do not continue to improve return for chest x-ray      (Z13.1) Screening for diabetes mellitus  Plan: Check glucose today    (Z12.5) Special screening for malignant neoplasm of prostate  Plan: PROSTATE SPEC ANTIGEN SCREEN             Patient has been advised of split billing requirements and indicates understanding: Yes      COUNSELING:  Reviewed preventive health counseling, as reflected in patient instructions       Regular  exercise       Healthy diet/nutrition       Vision screening       Hearing screening       Dental care       Bladder control       Fall risk prevention       Colon cancer screening       Prostate cancer screening        He reports that he has never smoked. He has never used smokeless tobacco.      Appropriate preventive services were discussed with this patient, including applicable screening as appropriate for cardiovascular disease, diabetes, osteopenia/osteoporosis, and glaucoma.  As appropriate for age/gender, discussed screening for colorectal cancer, prostate cancer, breast cancer, and cervical cancer. Checklist reviewing preventive services available has been given to the patient.    Reviewed patients plan of care and provided an AVS. The Basic Care Plan (routine screening as documented in Health Maintenance) for Mike meets the Care Plan requirement. This Care Plan has been established and reviewed with the Patient.          Wayne Reddy MD  [facility]    Identified Health Risks:    The patient was counseled and encouraged to consider modifying their diet and eating habits. He was provided with information on recommended healthy diet options.  The patient was provided with written information regarding signs of hearing loss.

## 2023-02-21 NOTE — PATIENT INSTRUCTIONS
Patient Education   Personalized Prevention Plan  You are due for the preventive services outlined below.  Your care team is available to assist you in scheduling these services.  If you have already completed any of these items, please share that information with your care team to update in your medical record.  Health Maintenance Due   Topic Date Due     ANNUAL REVIEW OF HM ORDERS  Never done     Asthma Action Plan - yearly  Never done     Zoster (Shingles) Vaccine (1 of 2) Never done     Cholesterol Lab  02/01/2020     Pneumococcal Vaccine (1 - PCV) Never done     AORTIC ANEURYSM SCREENING (SYSTEM ASSIGNED)  Never done       Understanding USDA MyPlate  The USDA has guidelines to help you make healthy food choices. These are called MyPlate. MyPlate shows the food groups that make up healthy meals using the image of a place setting. Before you eat, think about the healthiest choices for what to put on your plate or in your cup or bowl. To learn more about building a healthy plate, visit www.Q.MEplate.gov.    The food groups    Fruits. Any fruit or 100% fruit juice counts as part of the Fruit Group. Fruits may be fresh, canned, frozen, or dried, and may be whole, cut-up, or pureed. Make 1/2 of your plate fruits and vegetables.    Vegetables. Any vegetable or 100% vegetable juice counts as a member of the Vegetable Group. Vegetables may be fresh, frozen, canned, or dried. They can be served raw or cooked and may be whole, cut-up, or mashed. Make 1/2 of your plate fruits and vegetables.    Grains. All foods made from grains are part of the Grains Group. These include wheat, rice, oats, cornmeal, and barley. Grains are often used to make foods such as bread, pasta, oatmeal, cereal, tortillas, and grits. Grains should be no more than 1/4 of your plate. At least half of your grains should be whole grains.    Protein. This group includes meat, poultry, seafood, beans and peas, eggs, processed soy products (such as  tofu), nuts (including nut butters), and seeds. Make protein choices no more than 1/4 of your plate. Meat and poultry choices should be lean or low fat.    Dairy. The Dairy Group includes all fluid milk products and foods made from milk that contain calcium, such as yogurt and cheese. (Foods that have little calcium, such as cream, butter, and cream cheese, are not part of this group.) Most dairy choices should be low-fat or fat-free.    Oils. Oils aren't a food group, but they do contain essential nutrients. However it's important to watch your intake of oils. These are fats that are liquid at room temperature. They include canola, corn, olive, soybean, vegetable, and sunflower oil. Foods that are mainly oil include mayonnaise, certain salad dressings, and soft margarines. You likely already get your daily oil allowance from the foods you eat.  Things to limit  Eating healthy also means limiting these things in your diet:       Salt (sodium). Many processed foods have a lot of sodium. To keep sodium intake down, eat fresh vegetables, meats, poultry, and seafood when possible. Purchase low-sodium, reduced-sodium, or no-salt-added food products at the store. And don't add salt to your meals at home. Instead, season them with herbs and spices such as dill, oregano, cumin, and paprika. Or try adding flavor with lemon or lime zest and juice.    Saturated fat. Saturated fats are most often found in animal products such as beef, pork, and chicken. They are often solid at room temperature, such as butter. To reduce your saturated fat intake, choose leaner cuts of meat and poultry. And try healthier cooking methods such as grilling, broiling, roasting, or baking. For a simple lower-fat swap, use plain nonfat yogurt instead of mayonnaise when making potato salad or macaroni salad.    Added sugars. These are sugars added to foods. They are in foods such as ice cream, candy, soda, fruit drinks, sports drinks, energy drinks,  cookies, pastries, jams, and syrups. Cut down on added sugars by sharing sweet treats with a family member or friend. You can also choose fruit for dessert, and drink water or other unsweetened beverages.     [email or web address] last reviewed this educational content on 6/1/2020 2000-2021 [employer]. All rights reserved. This information is not intended as a substitute for professional medical care. Always follow your healthcare professional's instructions.          Signs of Hearing Loss      Hearing much better with one ear can be a sign of hearing loss.   Hearing loss is a problem shared by many people. In fact, it is one of the most common health problems, particularly as people age. Most people age 65 and older have some hearing loss. By age 80, almost everyone does. Hearing loss often occurs slowly over the years. So you may not realize your hearing has gotten worse.  Have your hearing checked  Call your healthcare provider if you:    Have to strain to hear normal conversation    Have to watch other people s faces very carefully to follow what they re saying    Need to ask people to repeat what they ve said    Often misunderstand what people are saying    Turn the volume of the television or radio up so high that others complain    Feel that people are mumbling when they re talking to you    Find that the effort to hear leaves you feeling tired and irritated    Notice, when using the phone, that you hear better with one ear than the other  [email or web address] last reviewed this educational content on 1/1/2020 2000-2021 [employer]. All rights reserved. This information is not intended as a substitute for professional medical care. Always follow your healthcare professional's instructions.

## 2023-03-20 DIAGNOSIS — R06.02 SOB (SHORTNESS OF BREATH): Primary | ICD-10-CM

## 2023-03-20 RX ORDER — FLUTICASONE PROPIONATE 110 UG/1
2 AEROSOL, METERED RESPIRATORY (INHALATION) 2 TIMES DAILY
Qty: 12 G | Refills: 5 | Status: SHIPPED | OUTPATIENT
Start: 2023-03-20 | End: 2024-05-28

## 2023-03-22 ENCOUNTER — OFFICE VISIT (OUTPATIENT)
Dept: FAMILY MEDICINE | Facility: CLINIC | Age: 68
End: 2023-03-22
Payer: COMMERCIAL

## 2023-03-22 VITALS
WEIGHT: 162.6 LBS | SYSTOLIC BLOOD PRESSURE: 147 MMHG | BODY MASS INDEX: 27.09 KG/M2 | DIASTOLIC BLOOD PRESSURE: 81 MMHG | TEMPERATURE: 96.7 F | RESPIRATION RATE: 20 BRPM | HEART RATE: 84 BPM | OXYGEN SATURATION: 90 % | HEIGHT: 65 IN

## 2023-03-22 DIAGNOSIS — N52.9 ERECTILE DYSFUNCTION, UNSPECIFIED ERECTILE DYSFUNCTION TYPE: ICD-10-CM

## 2023-03-22 DIAGNOSIS — N50.89 SCROTAL MASS: Primary | ICD-10-CM

## 2023-03-22 PROCEDURE — 99213 OFFICE O/P EST LOW 20 MIN: CPT | Performed by: FAMILY MEDICINE

## 2023-03-22 RX ORDER — SILDENAFIL 50 MG/1
50 TABLET, FILM COATED ORAL DAILY PRN
Qty: 15 TABLET | Refills: 1 | Status: SHIPPED | OUTPATIENT
Start: 2023-03-22 | End: 2024-03-11

## 2023-03-22 NOTE — PROGRESS NOTES
"  Assessment & Plan     Scrotal mass  Scrotal mass suspect inguinal hernia versus hydrocele  Scrotal ultrasound and follow-up when results are available  - US Testicular & Scrotum w Doppler Ltd; Future    Erectile dysfunction, unspecified erectile dysfunction type  Trial of sildenafil, side effects discussed  - sildenafil (VIAGRA) 50 MG tablet; Take 1 tablet (50 mg) by mouth daily as needed             BMI:   Estimated body mass index is 27.06 kg/m  as calculated from the following:    Height as of this encounter: 1.651 m (5' 5\").    Weight as of this encounter: 73.8 kg (162 lb 9.6 oz).           Wayne Reddy MD  [facility]    Cynthia Mckeon is a 67 year old, presenting for the following health issues:  Consult (Discuss possible hydrocele)  No flowsheet data found.  History of Present Illness       Reason for visit:  Possible hydro seal  Symptom onset:  1-2 weeks ago  Symptoms include:  Mass in scrotum  Symptom intensity:  Mild  Symptom progression:  Staying the same  Had these symptoms before:  Yes  Has tried/received treatment for these symptoms:  Yes  Previous treatment was successful:  Yes  Prior treatment description:  Surgery  What makes it worse:  No  What makes it better:  No    He eats 0-1 servings of fruits and vegetables daily.He consumes 0 sweetened beverage(s) daily.He exercises with enough effort to increase his heart rate 9 or less minutes per day.  He exercises with enough effort to increase his heart rate 3 or less days per week.   He is taking medications regularly.     Patient is here with a new onset right scrotal mass.  He is concerned about a hydrocele.  He had a large hydrocele removed on the left side in 2011.  The mass seems to wax and wane.  He has no pain.  He also has concerns about rectal dysfunction.  He would like to try medication.          Review of Systems   Constitutional, HEENT, cardiovascular, pulmonary, gi and gu systems are negative, " "except as otherwise noted.      Objective    BP (!) 147/81 (BP Location: Right arm, Patient Position: Sitting, Cuff Size: Adult Large)   Pulse 84   Temp (!) 96.7  F (35.9  C) (Tympanic)   Resp 20   Ht 1.651 m (5' 5\")   Wt 73.8 kg (162 lb 9.6 oz)   SpO2 90%   BMI 27.06 kg/m    Body mass index is 27.06 kg/m .  Physical Exam   Alert, oriented, no acute distress  Heart is regular rate rhythm  Lungs clear  Abdomen soft, nontender  Genital exam reveals normal penis, no discharge  Left testis is normal  Right testis is difficult to palpate, small mass in the right scrotum                    "

## 2023-05-24 ENCOUNTER — TELEPHONE (OUTPATIENT)
Dept: FAMILY MEDICINE | Facility: CLINIC | Age: 68
End: 2023-05-24
Payer: COMMERCIAL

## 2023-05-24 DIAGNOSIS — N50.3 EPIDIDYMAL CYST: Primary | ICD-10-CM

## 2023-05-24 NOTE — TELEPHONE ENCOUNTER
Order/Referral Request    Who is requesting: Patient     Orders being requested: Referral to see Urologist    Reason service is needed/diagnosis: Cyst    When are orders needed by: ASAP    Has this been discussed with Provider: Yes    Does patient have a preference on a Group/Provider/Facility? No    Does patient have an appointment scheduled?: No    Where to send orders: Place orders within Epic    Could we send this information to you in [facility] or would you prefer to receive a phone call?:   Patient would prefer a phone call   Okay to leave a detailed message?: Yes at Cell number on file:    Telephone Information:   Mobile [phone]

## 2023-05-25 NOTE — TELEPHONE ENCOUNTER
CSS spoke with pt re: referral placed for urology and that he should be hearing from referrals to help get set up for appt.

## 2023-07-14 RX ORDER — FLUTICASONE PROPIONATE 110 UG/1
1 AEROSOL, METERED RESPIRATORY (INHALATION) 2 TIMES DAILY
Qty: 12 G | Refills: 5 | Status: CANCELLED | OUTPATIENT
Start: 2023-07-14

## 2023-08-04 ENCOUNTER — OFFICE VISIT (OUTPATIENT)
Dept: UROLOGY | Facility: CLINIC | Age: 68
End: 2023-08-04
Attending: FAMILY MEDICINE
Payer: COMMERCIAL

## 2023-08-04 VITALS
HEIGHT: 65 IN | SYSTOLIC BLOOD PRESSURE: 136 MMHG | WEIGHT: 154 LBS | DIASTOLIC BLOOD PRESSURE: 74 MMHG | BODY MASS INDEX: 25.66 KG/M2

## 2023-08-04 DIAGNOSIS — N50.3 EPIDIDYMAL CYST: ICD-10-CM

## 2023-08-04 PROCEDURE — 99203 OFFICE O/P NEW LOW 30 MIN: CPT | Performed by: STUDENT IN AN ORGANIZED HEALTH CARE EDUCATION/TRAINING PROGRAM

## 2023-08-04 ASSESSMENT — PAIN SCALES - GENERAL: PAINLEVEL: NO PAIN (0)

## 2023-08-04 NOTE — NURSING NOTE
Chief Complaint   Patient presents with    Epididymal cyst      Pt states cyst appears on both sides and has been present for about a year. Pt states it is getting larger, is not painful but uncomfortable. Pt states he has previously had hydrocele removed. Pt has no issues with urination     Radha Leihg CMA

## 2023-08-04 NOTE — PROGRESS NOTES
[facility]  Main Office: [address]  [address]  [place] [address]       CHIEF COMPLAINT:  Left epididymal cysts    HISTORY:   69 yo M with left epididymal cysts. Increasing in size for about a year. Not painful. Is not really affecting his ability to do any activities    Remote hydrocele repair, he thinks it was the left side; he does not remember what hospital or what urologist    Not interested in future fertility; has not had a vasectomy    PAST MEDICAL HISTORY:   Past Medical History:   Diagnosis Date    Mumps     Uncomplicated asthma 1999       PAST SURGICAL HISTORY:   Past Surgical History:   Procedure Laterality Date    COLONOSCOPY  2020    Normal; repeat in 10 yrs    COLONOSCOPY  2009    Normal; repeat in 10 yrs    CT CALCIUM SCREENING  2016    Total Agatston score= 27    CYSTOSCOPY      CYSTOSCOPY, TRANSURETHRAL RESECTION (TUR) PROSTATE, COMBINED      Bladder surgery    HERNIA REPAIR      HIP SURGERY Right 1974    HYDROCELECTOMY SCROTAL Left 2011    Also right spermatocelectomy    ORTHOPEDIC SURGERY  2004    PROSTATE SURGERY      SHOULDER DEBRIDEMENT Right 2003    Right shoulder superolabral debridement; arthroscopic subacromial decompression    TESTICLE SURGERY      TONSILLECTOMY & ADENOIDECTOMY         FAMILY HISTORY:   Family History   Problem Relation Age of Onset    Alzheimer Disease Mother          at age 65yrs    Coronary Artery Disease Father     Diabetes Father         Type 2    Hyperlipidemia Father     Prostate Cancer Father     Prostate Cancer Brother     Myocardial Infarction Maternal Grandfather 62         at age 62    Myocardial Infarction Paternal Grandfather 62         at age 62       SOCIAL HISTORY:   Social History     Tobacco Use    Smoking status: Never    Smokeless tobacco: Never   Substance Use Topics    Alcohol use: Yes          Allergies   Allergen Reactions    Aspirin Hives     Other reaction(s):  "Anaphylaxis, Edema  Age 7           Current Outpatient Medications:     atorvastatin (LIPITOR) 20 MG tablet, Take 1 tablet (20 mg) by mouth daily, Disp: 90 tablet, Rfl: 3    fluticasone (FLOVENT HFA) 110 MCG/ACT inhaler, Inhale 2 puffs into the lungs 2 times daily, Disp: 12 g, Rfl: 5    albuterol (PROAIR HFA/PROVENTIL HFA/VENTOLIN HFA) 108 (90 Base) MCG/ACT inhaler, Inhale 2 puffs into the lungs every 4 hours as needed for wheezing (Patient not taking: Reported on 8/4/2023), Disp: 18 g, Rfl: 5    predniSONE (DELTASONE) 20 MG tablet, Take 2 tablets (40 mg) by mouth daily (Patient not taking: Reported on 8/4/2023), Disp: 14 tablet, Rfl: 1    sildenafil (VIAGRA) 50 MG tablet, Take 1 tablet (50 mg) by mouth daily as needed (Patient not taking: Reported on 8/4/2023), Disp: 15 tablet, Rfl: 1    Review Of Systems:  Skin: No rash, pruritis, or skin pigmentation  Eyes: No changes in vision  Ears/Nose/Throat: No changes in hearing, no nosebleeds  Respiratory: No shortness of breath, dyspnea on exertion, cough, or hemoptysis  Cardiovascular: No chest pain or palpitations  Gastrointestinal: No diarrhea or constipation. No abdominal pain. No hematochezia  Genitourinary: see HPI  Musculoskeletal: No pain or swelling of joints, normal range of motion  Neurologic: No weakness or tremors  Psychiatric: No recent changes in memory or mood  Hematologic/Lymphatic/Immunologic: No easy bruising or enlarged lymph nodes  Endocrine: No weight gain or loss      PHYSICAL EXAM:    /74   Ht 1.651 m (5' 5\")   Wt 69.9 kg (154 lb)   BMI 25.63 kg/m    General appearance: In NAD, conversant  HEENT: Normocephalic and atraumatic, anicteric sclera  Cardiovascular: Not examined  Respiratory: normal, non-labored breathing  Gastrointestinal: negative, Abdomen soft, non-tender, and non-distended.   Musculoskeletal: Not Examined  Peripheral Vascular/extremity: No peripheral edema  Skin: Normal temperature, turgor, and texture. No rash  Psychiatric: " Appropriate affect, alert and oriented to person, place, and time    Penis: circ phallus, orthotopic and patent meatus  Scrotal Skin: Normal. Appears to have a midline raphe scar from prior hydrocelectomy  Testicles: normal bilat  Epididymis: normal right epididymis. Large left epididymal head cyst almost the size of the left testicle    Cystoscopy: Not done      UA RESULTS:  No results for input(s): COLOR, APPEARANCE, URINEGLC, URINEBILI, URINEKETONE, SG, UBLD, URINEPH, PROTEIN, UROBILINOGEN, NITRITE, LEUKEST, RBCU, WBCU in the last [id number] hours.    Bladder Scan:     Other Labs:      Imaging Studies:     Scrotal ultrasound 4/5/2023  FINDINGS:     RIGHT: Right testicle measures 3.7 x 2.3 x 1.9 cm. Normal testicle with no masses. Normal arterial duplex and normal color flow. Normal epididymis. Small hydrocele. No varicocele.     LEFT: Left testicle measures 4.2 x 3.0 x 2.1 cm. No suspicious solid testicular masses. Prominent rete testis. Benign testicular cyst measuring up to 0.5 cm. Normal arterial duplex and normal color flow. Benign epididymal cyst, which measure 2.8 x 1.8 x 3.0 cm.  Two additional benign scrotal cysts superior to the larger epididymal cyst, which measure 1.7 x 1.3 x 1.5 cm and 1.3 x 1.3 x 1.2 cm. These were present on prior ultrasound 06/07/2011, with little change. No hydrocele. No varicocele.       CLINICAL IMPRESSION:   69 yo M referred with left epididymal cyst    Benign left epididymal cyst (or collection of cysts) which are about the size of the left testicle itself. Not bothersome.    Discussed observation vs. Spermatocelectomy. He wishes to observe for now    PLAN:   Return as needed if cyst is growing or becoming more bothersome, or has any other urologic concerns    Christopher Lu MD   [facility]  [phone] clinic phone

## 2023-08-04 NOTE — LETTER
2023       RE: Mike Damon  [address] [facility] [address]     Dear Colleague,    Thank you for referring your patient, Mike Damon, to the [facility] [place] [facility]. Please see a copy of my visit note below.    [facility]  Main Office: [address]  [address]  [place] [address]       CHIEF COMPLAINT:  Left epididymal cysts    HISTORY:   67 yo M with left epididymal cysts. Increasing in size for about a year. Not painful. Is not really affecting his ability to do any activities    Remote hydrocele repair, he thinks it was the left side; he does not remember what hospital or what urologist    Not interested in future fertility; has not had a vasectomy    PAST MEDICAL HISTORY:   Past Medical History:   Diagnosis Date    Mumps     Uncomplicated asthma 1999       PAST SURGICAL HISTORY:   Past Surgical History:   Procedure Laterality Date    COLONOSCOPY  2020    Normal; repeat in 10 yrs    COLONOSCOPY  2009    Normal; repeat in 10 yrs    CT CALCIUM SCREENING  2016    Total Agatston score= 27    CYSTOSCOPY      CYSTOSCOPY, TRANSURETHRAL RESECTION (TUR) PROSTATE, COMBINED      Bladder surgery    HERNIA REPAIR      HIP SURGERY Right     HYDROCELECTOMY SCROTAL Left 2011    Also right spermatocelectomy    ORTHOPEDIC SURGERY  2004    PROSTATE SURGERY      SHOULDER DEBRIDEMENT Right 2003    Right shoulder superolabral debridement; arthroscopic subacromial decompression    TESTICLE SURGERY      TONSILLECTOMY & ADENOIDECTOMY         FAMILY HISTORY:   Family History   Problem Relation Age of Onset    Alzheimer Disease Mother          at age 65yrs    Coronary Artery Disease Father     Diabetes Father         Type 2    Hyperlipidemia Father     Prostate Cancer Father     Prostate Cancer Brother     Myocardial Infarction Maternal Grandfather 62         at age  "62    Myocardial Infarction Paternal Grandfather 62         at age 62       SOCIAL HISTORY:   Social History     Tobacco Use    Smoking status: Never    Smokeless tobacco: Never   Substance Use Topics    Alcohol use: Yes          Allergies   Allergen Reactions    Aspirin Hives     Other reaction(s): Anaphylaxis, Edema  Age 7           Current Outpatient Medications:     atorvastatin (LIPITOR) 20 MG tablet, Take 1 tablet (20 mg) by mouth daily, Disp: 90 tablet, Rfl: 3    fluticasone (FLOVENT HFA) 110 MCG/ACT inhaler, Inhale 2 puffs into the lungs 2 times daily, Disp: 12 g, Rfl: 5    albuterol (PROAIR HFA/PROVENTIL HFA/VENTOLIN HFA) 108 (90 Base) MCG/ACT inhaler, Inhale 2 puffs into the lungs every 4 hours as needed for wheezing (Patient not taking: Reported on 2023), Disp: 18 g, Rfl: 5    predniSONE (DELTASONE) 20 MG tablet, Take 2 tablets (40 mg) by mouth daily (Patient not taking: Reported on 2023), Disp: 14 tablet, Rfl: 1    sildenafil (VIAGRA) 50 MG tablet, Take 1 tablet (50 mg) by mouth daily as needed (Patient not taking: Reported on 2023), Disp: 15 tablet, Rfl: 1    Review Of Systems:  Skin: No rash, pruritis, or skin pigmentation  Eyes: No changes in vision  Ears/Nose/Throat: No changes in hearing, no nosebleeds  Respiratory: No shortness of breath, dyspnea on exertion, cough, or hemoptysis  Cardiovascular: No chest pain or palpitations  Gastrointestinal: No diarrhea or constipation. No abdominal pain. No hematochezia  Genitourinary: see HPI  Musculoskeletal: No pain or swelling of joints, normal range of motion  Neurologic: No weakness or tremors  Psychiatric: No recent changes in memory or mood  Hematologic/Lymphatic/Immunologic: No easy bruising or enlarged lymph nodes  Endocrine: No weight gain or loss      PHYSICAL EXAM:    /74   Ht 1.651 m (5' 5\")   Wt 69.9 kg (154 lb)   BMI 25.63 kg/m    General appearance: In NAD, conversant  HEENT: Normocephalic and atraumatic, anicteric " sclera  Cardiovascular: Not examined  Respiratory: normal, non-labored breathing  Gastrointestinal: negative, Abdomen soft, non-tender, and non-distended.   Musculoskeletal: Not Examined  Peripheral Vascular/extremity: No peripheral edema  Skin: Normal temperature, turgor, and texture. No rash  Psychiatric: Appropriate affect, alert and oriented to person, place, and time    Penis: circ phallus, orthotopic and patent meatus  Scrotal Skin: Normal. Appears to have a midline raphe scar from prior hydrocelectomy  Testicles: normal bilat  Epididymis: normal right epididymis. Large left epididymal head cyst almost the size of the left testicle    Cystoscopy: Not done      UA RESULTS:  No results for input(s): COLOR, APPEARANCE, URINEGLC, URINEBILI, URINEKETONE, SG, UBLD, URINEPH, PROTEIN, UROBILINOGEN, NITRITE, LEUKEST, RBCU, WBCU in the last [id number] hours.    Bladder Scan:     Other Labs:      Imaging Studies:     Scrotal ultrasound 4/5/2023  FINDINGS:     RIGHT: Right testicle measures 3.7 x 2.3 x 1.9 cm. Normal testicle with no masses. Normal arterial duplex and normal color flow. Normal epididymis. Small hydrocele. No varicocele.     LEFT: Left testicle measures 4.2 x 3.0 x 2.1 cm. No suspicious solid testicular masses. Prominent rete testis. Benign testicular cyst measuring up to 0.5 cm. Normal arterial duplex and normal color flow. Benign epididymal cyst, which measure 2.8 x 1.8 x 3.0 cm.  Two additional benign scrotal cysts superior to the larger epididymal cyst, which measure 1.7 x 1.3 x 1.5 cm and 1.3 x 1.3 x 1.2 cm. These were present on prior ultrasound 06/07/2011, with little change. No hydrocele. No varicocele.       CLINICAL IMPRESSION:   67 yo M referred with left epididymal cyst    Benign left epididymal cyst (or collection of cysts) which are about the size of the left testicle itself. Not bothersome.    Discussed observation vs. Spermatocelectomy. He wishes to observe for now    PLAN:   Return as needed if  cyst is growing or becoming more bothersome, or has any other urologic concerns    Christopher Lu MD   [facility]  [phone] clinic phone

## 2023-12-19 ENCOUNTER — MYC MEDICAL ADVICE (OUTPATIENT)
Dept: FAMILY MEDICINE | Facility: CLINIC | Age: 68
End: 2023-12-19
Payer: COMMERCIAL

## 2023-12-19 DIAGNOSIS — J45.20 MILD INTERMITTENT ASTHMA, UNSPECIFIED WHETHER COMPLICATED: ICD-10-CM

## 2023-12-19 NOTE — TELEPHONE ENCOUNTER
Routing refill request to provider for review/approval because:  Drug not on the [facility] refill protocol     Last Written Prescription Date: 5/3/22  Last Fill Quantity: 14,  # refills: 1   Last office visit: 3/22/2023

## 2023-12-20 RX ORDER — PREDNISONE 20 MG/1
40 TABLET ORAL DAILY
Qty: 14 TABLET | Refills: 1 | Status: SHIPPED | OUTPATIENT
Start: 2023-12-20 | End: 2024-03-11

## 2024-03-10 SDOH — HEALTH STABILITY: PHYSICAL HEALTH: ON AVERAGE, HOW MANY MINUTES DO YOU ENGAGE IN EXERCISE AT THIS LEVEL?: 20 MIN

## 2024-03-10 SDOH — HEALTH STABILITY: PHYSICAL HEALTH: ON AVERAGE, HOW MANY DAYS PER WEEK DO YOU ENGAGE IN MODERATE TO STRENUOUS EXERCISE (LIKE A BRISK WALK)?: 2 DAYS

## 2024-03-10 ASSESSMENT — ASTHMA QUESTIONNAIRES
QUESTION_2 LAST FOUR WEEKS HOW OFTEN HAVE YOU HAD SHORTNESS OF BREATH: MORE THAN ONCE A DAY
QUESTION_4 LAST FOUR WEEKS HOW OFTEN HAVE YOU USED YOUR RESCUE INHALER OR NEBULIZER MEDICATION (SUCH AS ALBUTEROL): THREE OR MORE TIMES PER DAY
QUESTION_3 LAST FOUR WEEKS HOW OFTEN DID YOUR ASTHMA SYMPTOMS (WHEEZING, COUGHING, SHORTNESS OF BREATH, CHEST TIGHTNESS OR PAIN) WAKE YOU UP AT NIGHT OR EARLIER THAN USUAL IN THE MORNING: FOUR OR MORE NIGHTS A WEEK
ACT_TOTALSCORE: 10
ACT_TOTALSCORE: 10
QUESTION_5 LAST FOUR WEEKS HOW WOULD YOU RATE YOUR ASTHMA CONTROL: POORLY CONTROLLED
QUESTION_1 LAST FOUR WEEKS HOW MUCH OF THE TIME DID YOUR ASTHMA KEEP YOU FROM GETTING AS MUCH DONE AT WORK, SCHOOL OR AT HOME: NONE OF THE TIME

## 2024-03-10 ASSESSMENT — SOCIAL DETERMINANTS OF HEALTH (SDOH): HOW OFTEN DO YOU GET TOGETHER WITH FRIENDS OR RELATIVES?: ONCE A WEEK

## 2024-03-11 ENCOUNTER — OFFICE VISIT (OUTPATIENT)
Dept: FAMILY MEDICINE | Facility: CLINIC | Age: 69
End: 2024-03-11
Payer: COMMERCIAL

## 2024-03-11 VITALS
OXYGEN SATURATION: 93 % | SYSTOLIC BLOOD PRESSURE: 125 MMHG | TEMPERATURE: 96.6 F | WEIGHT: 158.9 LBS | BODY MASS INDEX: 26.48 KG/M2 | DIASTOLIC BLOOD PRESSURE: 80 MMHG | HEIGHT: 65 IN | RESPIRATION RATE: 20 BRPM | HEART RATE: 72 BPM

## 2024-03-11 DIAGNOSIS — Z00.00 ENCOUNTER FOR MEDICARE ANNUAL WELLNESS EXAM: Primary | ICD-10-CM

## 2024-03-11 DIAGNOSIS — J45.20 MILD INTERMITTENT ASTHMA, UNSPECIFIED WHETHER COMPLICATED: ICD-10-CM

## 2024-03-11 DIAGNOSIS — Z23 NEED FOR SHINGLES VACCINE: ICD-10-CM

## 2024-03-11 DIAGNOSIS — Z13.1 SCREENING FOR DIABETES MELLITUS: ICD-10-CM

## 2024-03-11 DIAGNOSIS — E78.2 MIXED HYPERLIPIDEMIA: ICD-10-CM

## 2024-03-11 LAB
CHOLEST SERPL-MCNC: 167 MG/DL
FASTING STATUS PATIENT QL REPORTED: YES
FASTING STATUS PATIENT QL REPORTED: YES
GLUCOSE SERPL-MCNC: 110 MG/DL (ref 70–99)
HDLC SERPL-MCNC: 58 MG/DL
LDLC SERPL CALC-MCNC: 97 MG/DL
NONHDLC SERPL-MCNC: 109 MG/DL
TRIGL SERPL-MCNC: 60 MG/DL

## 2024-03-11 PROCEDURE — 80061 LIPID PANEL: CPT | Performed by: FAMILY MEDICINE

## 2024-03-11 PROCEDURE — 36415 COLL VENOUS BLD VENIPUNCTURE: CPT | Performed by: FAMILY MEDICINE

## 2024-03-11 PROCEDURE — 82947 ASSAY GLUCOSE BLOOD QUANT: CPT | Mod: QW | Performed by: FAMILY MEDICINE

## 2024-03-11 PROCEDURE — G0438 PPPS, INITIAL VISIT: HCPCS | Performed by: FAMILY MEDICINE

## 2024-03-11 PROCEDURE — 90480 ADMN SARSCOV2 VAC 1/ONLY CMP: CPT | Performed by: FAMILY MEDICINE

## 2024-03-11 PROCEDURE — 91320 SARSCV2 VAC 30MCG TRS-SUC IM: CPT | Performed by: FAMILY MEDICINE

## 2024-03-11 PROCEDURE — 99214 OFFICE O/P EST MOD 30 MIN: CPT | Mod: 25 | Performed by: FAMILY MEDICINE

## 2024-03-11 RX ORDER — FLUTICASONE PROPIONATE AND SALMETEROL XINAFOATE 115; 21 UG/1; UG/1
2 AEROSOL, METERED RESPIRATORY (INHALATION) 2 TIMES DAILY
Qty: 12 G | Refills: 3 | Status: SHIPPED | OUTPATIENT
Start: 2024-03-11 | End: 2024-03-14

## 2024-03-11 RX ORDER — PREDNISONE 20 MG/1
40 TABLET ORAL DAILY PRN
Qty: 14 TABLET | Refills: 1 | Status: SHIPPED | OUTPATIENT
Start: 2024-03-11 | End: 2024-05-28

## 2024-03-11 RX ORDER — ATORVASTATIN CALCIUM 20 MG/1
20 TABLET, FILM COATED ORAL DAILY
Qty: 90 TABLET | Refills: 3 | Status: SHIPPED | OUTPATIENT
Start: 2024-03-11

## 2024-03-11 NOTE — PROGRESS NOTES
"Preventive Care Visit  [facility]  Wayne Reddy MD, Family Medicine  Mar 11, 2024    Assessment & Plan     Encounter for Medicare annual wellness exam  Doing well, discussed health maintenance, immunizations, routine follow-up    Mixed hyperlipidemia  Controlled, continue current medication, check labs  - Lipid panel reflex to direct LDL Non-fasting; Future  - atorvastatin (LIPITOR) 20 MG tablet; Take 1 tablet (20 mg) by mouth daily    Mild intermittent asthma, unspecified whether complicated  Not well-controlled with nighttime symptoms.  Changed to LABA/ICS and continue using short acting bronchodilator  - predniSONE (DELTASONE) 20 MG tablet; Take 2 tablets (40 mg) by mouth daily as needed (for allergies)  - fluticasone-salmeterol (ADVAIR HFA) 115-21 MCG/ACT inhaler; Inhale 2 puffs into the lungs 2 times daily    Need for shingles vaccine      Screening for diabetes mellitus    - Glucose; Future  - Glucose    Patient has been advised of split billing requirements and indicates understanding: Yes          BMI  Estimated body mass index is 26.44 kg/m  as calculated from the following:    Height as of this encounter: 1.651 m (5' 5\").    Weight as of this encounter: 72.1 kg (158 lb 14.4 oz).       Counseling  Appropriate preventive services were discussed with this patient, including applicable screening as appropriate for fall prevention, nutrition, physical activity, Tobacco-use cessation, weight loss and cognition.  Checklist reviewing preventive services available has been given to the patient.  Reviewed patient's diet, addressing concerns and/or questions.   He is at risk for lack of exercise and has been provided with information to increase physical activity for the benefit of his well-being.   The patient was provided with written information regarding signs of hearing loss.       MEDICATIONS:  Continue current medications without change  Regular exercise    Cynthia   Mike is " a 68 year old, presenting for the following:  Wellness Visit (AWV)        3/11/2024     8:03 AM   Additional Questions   Roomed by Sade BUTTERFIELD CMA   Accompanied by Self         Health Care Directive  Patient does not have a Health Care Directive or Living Will: Discussed advance care planning with patient; information given to patient to review.    HPI  Patient is here for AMV and has been doing well.  He has had a persistent cough since COVID infection at he end of November.  He has been compliant with use of inhalers.    Lipids controlled, tolerates atorvastatin            3/10/2024   General Health   How would you rate your overall physical health? Excellent   Feel stress (tense, anxious, or unable to sleep) Not at all         3/10/2024   Nutrition   Diet: Regular (no restrictions)         3/10/2024   Exercise   Days per week of moderate/strenous exercise 2 days   Average minutes spent exercising at this level 20 min   (!) EXERCISE CONCERN      3/10/2024   Social Factors   Frequency of gathering with friends or relatives Once a week   Worry food won't last until get money to buy more No   Food not last or not have enough money for food? No   Do you have housing?  Yes   Are you worried about losing your housing? No   Lack of transportation? No   Unable to get utilities (heat,electricity)? No         3/10/2024   Activities of Daily Living- Home Safety   Needs help with the following daily activites None of the above   Safety concerns in the home None of the above         3/10/2024   Dental   Dentist two times every year? Yes         3/10/2024   Hearing Screening   Hearing concerns? (!) I FEEL THAT PEOPLE ARE MUMBLING OR NOT SPEAKING CLEARLY.    (!) IT'S HARD TO FOLLOW A CONVERSATION IN A NOISY RESTAURANT OR CROWDED ROOM.    (!) TROUBLE FOLLOWING DIALOGUE IN THE THEATHER.         3/10/2024   Driving Risk Screening   Patient/family members have concerns about driving No         3/10/2024   General Alertness/Fatigue  Screening   Have you been more tired than usual lately? No         3/10/2024   Urinary Incontinence Screening   Bothered by leaking urine in past 6 months No         3/10/2024   TB Screening   Were you born outside of [place]?  No         Today's PHQ-2 Score:       3/10/2024     6:41 PM   PHQ-2 ( 1999 Pf[employer])   Q1: Little interest or pleasure in doing things 0   Q2: Feeling down, depressed or hopeless 0   PHQ-2 Score 0   Q1: Little interest or pleasure in doing things Not at all   Q2: Feeling down, depressed or hopeless Not at all   PHQ-2 Score 0           3/10/2024   Substance Use   Alcohol more than 3/day or more than 7/wk No   Do you have a current opioid prescription? No   How severe/bad is pain from 1 to 10? 0/10 (No Pain)   Do you use any other substances recreationally? (!) ALCOHOL     Social History     Tobacco Use    Smoking status: Never     Passive exposure: Never    Smokeless tobacco: Never   Vaping Use    Vaping Use: Never used   Substance Use Topics    Alcohol use: Yes    Drug use: Never           3/10/2024   AAA Screening   Family history of Abdominal Aortic Aneurysm (AAA)? No   Last PSA:   Prostate Specific Antigen Screen   Date Value Ref Range Status   02/21/2023 1.23 0.00 - 4.50 ng/mL Final   01/20/2021 1.3 ng/mL Final     ASCVD Risk   The 10-year ASCVD risk score (Geremias GAMA, et al., 2019) is: 11.6%    Values used to calculate the score:      Age: 68 years      Sex: Male      Is Non- : No      Diabetic: No      Tobacco smoker: No      Systolic Blood Pressure: 125 mmHg      Is BP treated: No      HDL Cholesterol: 63 mg/dL      Total Cholesterol: 200 mg/dL            Reviewed and updated as needed this visit by Provider                      Current providers sharing in care for this patient include:  Patient Care Team:  Wayne Reddy MD as PCP - General (Family Medicine)  Wayne Reddy MD as Assigned PCP  Christopher Lu MD as MD (Urology)  Christopher Lu,  "MD as Assigned Surgical Provider    The following health maintenance items are reviewed in Epic and correct as of today:  Health Maintenance   Topic Date Due    ASTHMA ACTION PLAN  Never done    ZOSTER IMMUNIZATION (1 of 2) Never done    COVID-19 Vaccine (6 - 2023-24 season) 09/01/2023    LIPID  02/21/2024    ANNUAL REVIEW OF HM ORDERS  02/21/2024    MEDICARE ANNUAL WELLNESS VISIT  02/21/2024    HEPATITIS C SCREENING  02/21/2028 (Originally 8/3/1973)    ASTHMA CONTROL TEST  09/11/2024    FALL RISK ASSESSMENT  03/11/2025    GLUCOSE  02/21/2026    ADVANCE CARE PLANNING  03/23/2028    COLORECTAL CANCER SCREENING  02/07/2030    DTAP/TDAP/TD IMMUNIZATION (3 - Td or Tdap) 10/24/2031    PHQ-2 (once per calendar year)  Completed    INFLUENZA VACCINE  Completed    Pneumococcal Vaccine: 65+ Years  Completed    RSV VACCINE (Pregnancy & 60+)  Completed    IPV IMMUNIZATION  Aged Out    HPV IMMUNIZATION  Aged Out    MENINGITIS IMMUNIZATION  Aged Out    RSV MONOCLONAL ANTIBODY  Aged Out         Review of Systems  Constitutional, neuro, ENT, endocrine, pulmonary, cardiac, gastrointestinal, genitourinary, musculoskeletal, integument and psychiatric systems are negative, except as otherwise noted.     Objective    Exam  /80 (BP Location: Right arm, Patient Position: Sitting, Cuff Size: Adult Regular)   Pulse 72   Temp (!) 96.6  F (35.9  C) (Tympanic)   Resp 20   Ht 1.651 m (5' 5\")   Wt 72.1 kg (158 lb 14.4 oz)   SpO2 93%   BMI 26.44 kg/m     Estimated body mass index is 26.44 kg/m  as calculated from the following:    Height as of this encounter: 1.651 m (5' 5\").    Weight as of this encounter: 72.1 kg (158 lb 14.4 oz).    Physical Exam  GENERAL: alert and no distress  EYES: Eyes grossly normal to inspection, PERRL and conjunctivae and sclerae normal  NECK: no adenopathy, no asymmetry, masses, or scars  RESP: expiratory wheezes R lower posterior  CV: regular rate and rhythm, normal S1 S2, no S3 or S4, no murmur, click or " rub, no peripheral edema  ABDOMEN: soft, nontender, no hepatosplenomegaly, no masses and bowel sounds normal  MS: no gross musculoskeletal defects noted, no edema  PSYCH: mentation appears normal, affect normal/bright        3/11/2024   Mini Cog   Clock Draw Score 0 Abnormal   3 Item Recall 1 object recalled   Mini Cog Total Score 1             3/22/2023   Vision Screen   Vision Screen Results REFER     Signed Electronically by: Wayne Reddy MD

## 2024-03-13 ENCOUNTER — TELEPHONE (OUTPATIENT)
Dept: FAMILY MEDICINE | Facility: CLINIC | Age: 69
End: 2024-03-13
Payer: COMMERCIAL

## 2024-03-13 DIAGNOSIS — R05.3 CHRONIC COUGH: Primary | ICD-10-CM

## 2024-03-13 RX ORDER — FLUTICASONE PROPIONATE AND SALMETEROL XINAFOATE 115; 21 UG/1; UG/1
2 AEROSOL, METERED RESPIRATORY (INHALATION) 2 TIMES DAILY
Qty: 12 G | Refills: 2 | Status: SHIPPED | OUTPATIENT
Start: 2024-03-13 | End: 2024-03-14

## 2024-03-14 RX ORDER — FLUTICASONE FUROATE AND VILANTEROL 200; 25 UG/1; UG/1
1 POWDER RESPIRATORY (INHALATION) DAILY
Qty: 60 EACH | Refills: 5 | Status: SHIPPED | OUTPATIENT
Start: 2024-03-14 | End: 2024-05-28

## 2024-05-28 ENCOUNTER — ANCILLARY PROCEDURE (OUTPATIENT)
Dept: GENERAL RADIOLOGY | Facility: CLINIC | Age: 69
End: 2024-05-28
Attending: NURSE PRACTITIONER
Payer: COMMERCIAL

## 2024-05-28 ENCOUNTER — TELEPHONE (OUTPATIENT)
Dept: FAMILY MEDICINE | Facility: CLINIC | Age: 69
End: 2024-05-28

## 2024-05-28 ENCOUNTER — OFFICE VISIT (OUTPATIENT)
Dept: FAMILY MEDICINE | Facility: CLINIC | Age: 69
End: 2024-05-28
Payer: COMMERCIAL

## 2024-05-28 VITALS
TEMPERATURE: 98.4 F | WEIGHT: 153.3 LBS | DIASTOLIC BLOOD PRESSURE: 70 MMHG | HEART RATE: 98 BPM | SYSTOLIC BLOOD PRESSURE: 116 MMHG | HEIGHT: 65 IN | BODY MASS INDEX: 25.54 KG/M2 | OXYGEN SATURATION: 88 %

## 2024-05-28 DIAGNOSIS — R06.2 WHEEZING: ICD-10-CM

## 2024-05-28 DIAGNOSIS — R06.02 SOB (SHORTNESS OF BREATH): ICD-10-CM

## 2024-05-28 DIAGNOSIS — R06.2 WHEEZING: Primary | ICD-10-CM

## 2024-05-28 DIAGNOSIS — R05.3 CHRONIC COUGH: ICD-10-CM

## 2024-05-28 DIAGNOSIS — J45.20 MILD INTERMITTENT ASTHMA, UNSPECIFIED WHETHER COMPLICATED: Primary | ICD-10-CM

## 2024-05-28 DIAGNOSIS — J45.20 MILD INTERMITTENT ASTHMA, UNSPECIFIED WHETHER COMPLICATED: ICD-10-CM

## 2024-05-28 PROCEDURE — 94640 AIRWAY INHALATION TREATMENT: CPT | Performed by: NURSE PRACTITIONER

## 2024-05-28 PROCEDURE — 99214 OFFICE O/P EST MOD 30 MIN: CPT | Mod: 25 | Performed by: NURSE PRACTITIONER

## 2024-05-28 PROCEDURE — 71046 X-RAY EXAM CHEST 2 VIEWS: CPT | Mod: TC | Performed by: STUDENT IN AN ORGANIZED HEALTH CARE EDUCATION/TRAINING PROGRAM

## 2024-05-28 RX ORDER — PREDNISONE 20 MG/1
40 TABLET ORAL DAILY PRN
Qty: 14 TABLET | Refills: 1 | Status: SHIPPED | OUTPATIENT
Start: 2024-05-28

## 2024-05-28 RX ORDER — ALBUTEROL SULFATE 0.83 MG/ML
2.5 SOLUTION RESPIRATORY (INHALATION) ONCE
Status: COMPLETED | OUTPATIENT
Start: 2024-05-28 | End: 2024-05-28

## 2024-05-28 RX ORDER — ALBUTEROL SULFATE 0.83 MG/ML
2.5 SOLUTION RESPIRATORY (INHALATION) EVERY 6 HOURS PRN
Qty: 50 ML | Refills: 1 | Status: SHIPPED | OUTPATIENT
Start: 2024-05-28

## 2024-05-28 RX ORDER — FLUTICASONE FUROATE AND VILANTEROL 200; 25 UG/1; UG/1
1 POWDER RESPIRATORY (INHALATION) DAILY
Qty: 60 EACH | Refills: 5 | Status: SHIPPED | OUTPATIENT
Start: 2024-05-28

## 2024-05-28 RX ORDER — FLUTICASONE PROPIONATE 110 UG/1
2 AEROSOL, METERED RESPIRATORY (INHALATION) 2 TIMES DAILY
Qty: 12 G | Refills: 5 | Status: SHIPPED | OUTPATIENT
Start: 2024-05-28 | End: 2024-05-31

## 2024-05-28 RX ORDER — ALBUTEROL SULFATE 90 UG/1
2 AEROSOL, METERED RESPIRATORY (INHALATION) EVERY 4 HOURS PRN
Qty: 18 G | Refills: 5 | Status: SHIPPED | OUTPATIENT
Start: 2024-05-28

## 2024-05-28 RX ADMIN — ALBUTEROL SULFATE 2.5 MG: 0.83 SOLUTION RESPIRATORY (INHALATION) at 10:48

## 2024-05-28 ASSESSMENT — ASTHMA QUESTIONNAIRES
QUESTION_5 LAST FOUR WEEKS HOW WOULD YOU RATE YOUR ASTHMA CONTROL: POORLY CONTROLLED
QUESTION_2 LAST FOUR WEEKS HOW OFTEN HAVE YOU HAD SHORTNESS OF BREATH: MORE THAN ONCE A DAY
ACT_TOTALSCORE: 10
ACT_TOTALSCORE: 10
QUESTION_1 LAST FOUR WEEKS HOW MUCH OF THE TIME DID YOUR ASTHMA KEEP YOU FROM GETTING AS MUCH DONE AT WORK, SCHOOL OR AT HOME: A LITTLE OF THE TIME
QUESTION_4 LAST FOUR WEEKS HOW OFTEN HAVE YOU USED YOUR RESCUE INHALER OR NEBULIZER MEDICATION (SUCH AS ALBUTEROL): THREE OR MORE TIMES PER DAY
QUESTION_3 LAST FOUR WEEKS HOW OFTEN DID YOUR ASTHMA SYMPTOMS (WHEEZING, COUGHING, SHORTNESS OF BREATH, CHEST TIGHTNESS OR PAIN) WAKE YOU UP AT NIGHT OR EARLIER THAN USUAL IN THE MORNING: TWO OR THREE NIGHTS A WEEK

## 2024-05-28 ASSESSMENT — ENCOUNTER SYMPTOMS: WHEEZING: 1

## 2024-05-28 NOTE — TELEPHONE ENCOUNTER
David requesting an alternative for Fluticasone HFA Inhaler due to coverage.    Alternative given: Arnuity Ellipya or Qvarred inhaler

## 2024-05-28 NOTE — PROGRESS NOTES
"  Assessment & Plan     (R06.2) Wheezing  (primary encounter diagnosis)  Comment:   Plan: albuterol (PROVENTIL) neb solution 2.5 mg, XR         Chest 2 Views, Miscellaneous DME Order,         albuterol (PROVENTIL) (2.5 MG/3ML) 0.083% neb         solution            (R06.02) SOB (shortness of breath)  Comment:   Plan: fluticasone (FLOVENT HFA) 110 MCG/ACT inhaler,         albuterol (PROVENTIL) neb solution 2.5 mg, XR         Chest 2 Views, Miscellaneous DME Order,         albuterol (PROVENTIL) (2.5 MG/3ML) 0.083% neb         solution            (R05.3) Chronic cough  Comment:   Plan: albuterol (PROAIR HFA/PROVENTIL HFA/VENTOLIN         HFA) 108 (90 Base) MCG/ACT inhaler,         fluticasone-vilanterol (BREO ELLIPTA) 200-25         MCG/ACT inhaler            (J45.20) Mild intermittent asthma, unspecified whether complicated  Comment:   Plan: predniSONE (DELTASONE) 20 MG tablet, albuterol         (PROVENTIL) neb solution 2.5 mg, Miscellaneous         DME Order, albuterol (PROVENTIL) (2.5 MG/3ML)         0.083% neb solution            Felt significantly improved after albuterol neb, no further wheezes appreciated.  Sent with rx for his own neb machine being that this episode of SOB/wheeze hit him very hard and fast. He will take burst of Prednisone at 40mg daily for 7 days.  Start breo elipta (call /pharm to contact if different rx needed), Flovent as back up, Albuterol q4 hours prn.   CXR showed significant scoliosis, unclear if that could be affecting right lung sounds          BMI  Estimated body mass index is 25.51 kg/m  as calculated from the following:    Height as of this encounter: 1.651 m (5' 5\").    Weight as of this encounter: 69.5 kg (153 lb 4.8 oz).             Cynthia Mckeon is a 68 year old, presenting for the following health issues: struggling with asthma, lots of shortness of breath, wheezing, cough, on Friday he feels like things really got bad, Albuterol is not helping very much    Asthma started " to flare 48 hours ago.   No known trigger  The Albuterol is not helping, is 'using it too much'  Sometimes soybean dust with combining triggers asthma.   He tends to start the Flovent in April and use through mid June, typically doesn't need albuterol all winter  Never filled rx for the Breo Elipta.  Does not have albuterol neb at home  Asthma and Shortness of Breath        5/28/2024    10:09 AM   Additional Questions   Roomed by aurelio jamil   Accompanied by self     Allergies    History of Present Illness     Asthma:  He presents for follow up of asthma.  He has some cough, some wheezing, and some shortness of breath.  He is using a relief medication every 4 hours. He typically misses taking his controller medication 1 time(s) per week. Patient is aware of the following triggers: strong odors and fumes. The patient has not had a visit to the Emergency Room, Urgent Care or Hospital due to asthma since the last clinic visit.     He eats 0-1 servings of fruits and vegetables daily.He consumes 0 sweetened beverage(s) daily.He exercises with enough effort to increase his heart rate 9 or less minutes per day.  He exercises with enough effort to increase his heart rate 4 days per week.   He is taking medications regularly.         2/15/2023    12:10 PM 3/10/2024     6:42 PM 5/28/2024     7:57 AM   ACT Total Scores   ACT TOTAL SCORE (Goal Greater than or Equal to 20) 10 10 10   In the past 12 months, how many times did you visit the emergency room for your asthma without being admitted to the hospital? 0 0 0   In the past 12 months, how many times were you hospitalized overnight because of your asthma? 0 0 0        Hyperlipidemia Follow-Up    Are you regularly taking any medication or supplement to lower your cholesterol?   Yes- Atorvastatin  Are you having muscle aches or other side effects that you think could be caused by your cholesterol lowering medication?  No              Objective    /70 (BP Location: Right arm,  "Patient Position: Sitting)   Pulse 98   Temp 98.4  F (36.9  C)   Ht 1.651 m (5' 5\")   Wt 69.5 kg (153 lb 4.8 oz)   SpO2 (!) 88%   BMI 25.51 kg/m    Body mass index is 25.51 kg/m .  Physical Exam   GENERAL: alert and no distress  EYES: Eyes grossly normal to inspection, PERRL and conjunctivae and sclerae normal  HENT: ear canals and TM's normal, nose and mouth without ulcers or lesions  NECK: no adenopathy, no asymmetry, masses, or scars  RESP: Decreased breath sounds more on right than left, wheezing throughout bilat  CV: regular rate and rhythm, normal S1 S2, no S3 or S4, no murmur, click or rub, no peripheral edema  MS: no gross musculoskeletal defects noted, no edema            Signed Electronically by: Caryl Vidal NP    DME (Durable Medical Equipment) Orders and Documentation  No orders of the defined types were placed in this encounter.     The patient was assessed and it was determined the patient is in need of the following listed DME Supplies/Equipment. Please complete supporting documentation below to demonstrate medical necessity.         "

## 2024-05-30 ENCOUNTER — MYC MEDICAL ADVICE (OUTPATIENT)
Dept: FAMILY MEDICINE | Facility: CLINIC | Age: 69
End: 2024-05-30
Payer: COMMERCIAL

## 2024-05-30 DIAGNOSIS — J45.20 MILD INTERMITTENT ASTHMA, UNSPECIFIED WHETHER COMPLICATED: Primary | ICD-10-CM

## 2024-05-31 NOTE — TELEPHONE ENCOUNTER
RN contacted [facility] - pharmacist confirmed that Fluticasone (Flovent Diskus) is not covered by insurance and they are requesting preferred alternative of Arnuity Ellipta.    Medication pended for provider.

## 2024-06-03 ENCOUNTER — TELEPHONE (OUTPATIENT)
Dept: FAMILY MEDICINE | Facility: CLINIC | Age: 69
End: 2024-06-03
Payer: COMMERCIAL

## 2024-06-03 NOTE — TELEPHONE ENCOUNTER
"We have received multiple requests from the pharmacy to change patient's inhaler. Each request has suggested a new inhaler that would be covered by patient's insurance.     I called and spoke with the insurance ([employer]) and am told that Breo Ellipta is preferred and a covered medication. A prescription for this was sent to the pharmacy 05/28/2024. I am also told that the pharmacy did have a paid claim but for whatever reason, it was \"reversed\", per the insurance company.     In addition to this, some of the documentation received from the pharmacy showed a patient ID of [id number]. When speaking with [employer], I am told the ID is [id number]. I returned a message to the pharmacy asking that they confirm with patient that they have the most up to date insurance information on file. I also requested that they fill the Breo Ellipta prescription.  "

## 2024-06-03 NOTE — TELEPHONE ENCOUNTER
[facility] with [facility] out of pocket cost will be $393 after insurance. QVAR 80mcg costs $343 retail price; pharmacy unable to check out of pocket expense without a prescription.

## 2024-12-05 ENCOUNTER — TRANSFERRED RECORDS (OUTPATIENT)
Dept: HEALTH INFORMATION MANAGEMENT | Facility: CLINIC | Age: 69
End: 2024-12-05
Payer: COMMERCIAL

## 2025-03-18 SDOH — HEALTH STABILITY: PHYSICAL HEALTH: ON AVERAGE, HOW MANY MINUTES DO YOU ENGAGE IN EXERCISE AT THIS LEVEL?: 10 MIN

## 2025-03-18 SDOH — HEALTH STABILITY: PHYSICAL HEALTH: ON AVERAGE, HOW MANY DAYS PER WEEK DO YOU ENGAGE IN MODERATE TO STRENUOUS EXERCISE (LIKE A BRISK WALK)?: 2 DAYS

## 2025-03-18 ASSESSMENT — ASTHMA QUESTIONNAIRES
QUESTION_2 LAST FOUR WEEKS HOW OFTEN HAVE YOU HAD SHORTNESS OF BREATH: ONCE A DAY
ACT_TOTALSCORE: 19
QUESTION_5 LAST FOUR WEEKS HOW WOULD YOU RATE YOUR ASTHMA CONTROL: WELL CONTROLLED
QUESTION_4 LAST FOUR WEEKS HOW OFTEN HAVE YOU USED YOUR RESCUE INHALER OR NEBULIZER MEDICATION (SUCH AS ALBUTEROL): TWO OR THREE TIMES PER WEEK
QUESTION_3 LAST FOUR WEEKS HOW OFTEN DID YOUR ASTHMA SYMPTOMS (WHEEZING, COUGHING, SHORTNESS OF BREATH, CHEST TIGHTNESS OR PAIN) WAKE YOU UP AT NIGHT OR EARLIER THAN USUAL IN THE MORNING: NOT AT ALL
QUESTION_1 LAST FOUR WEEKS HOW MUCH OF THE TIME DID YOUR ASTHMA KEEP YOU FROM GETTING AS MUCH DONE AT WORK, SCHOOL OR AT HOME: NONE OF THE TIME
ACT_TOTALSCORE: 19

## 2025-03-18 ASSESSMENT — SOCIAL DETERMINANTS OF HEALTH (SDOH): HOW OFTEN DO YOU GET TOGETHER WITH FRIENDS OR RELATIVES?: ONCE A WEEK

## 2025-03-19 ENCOUNTER — OFFICE VISIT (OUTPATIENT)
Dept: FAMILY MEDICINE | Facility: CLINIC | Age: 70
End: 2025-03-19
Payer: COMMERCIAL

## 2025-03-19 ENCOUNTER — TELEPHONE (OUTPATIENT)
Dept: FAMILY MEDICINE | Facility: CLINIC | Age: 70
End: 2025-03-19

## 2025-03-19 VITALS
HEART RATE: 72 BPM | OXYGEN SATURATION: 96 % | SYSTOLIC BLOOD PRESSURE: 128 MMHG | TEMPERATURE: 97.9 F | DIASTOLIC BLOOD PRESSURE: 82 MMHG | WEIGHT: 161.5 LBS | BODY MASS INDEX: 26.91 KG/M2 | HEIGHT: 65 IN | RESPIRATION RATE: 16 BRPM

## 2025-03-19 DIAGNOSIS — R73.09 ELEVATED GLUCOSE: ICD-10-CM

## 2025-03-19 DIAGNOSIS — Z00.00 ENCOUNTER FOR MEDICARE ANNUAL WELLNESS EXAM: ICD-10-CM

## 2025-03-19 DIAGNOSIS — E78.2 MIXED HYPERLIPIDEMIA: Primary | ICD-10-CM

## 2025-03-19 DIAGNOSIS — Z12.5 SCREENING FOR PROSTATE CANCER: ICD-10-CM

## 2025-03-19 DIAGNOSIS — J45.50 SEVERE PERSISTENT ASTHMA WITHOUT COMPLICATION (H): ICD-10-CM

## 2025-03-19 DIAGNOSIS — Z80.42 FAMILY HISTORY OF PROSTATE CANCER: ICD-10-CM

## 2025-03-19 PROCEDURE — 3079F DIAST BP 80-89 MM HG: CPT | Performed by: NURSE PRACTITIONER

## 2025-03-19 PROCEDURE — G0439 PPPS, SUBSEQ VISIT: HCPCS | Performed by: NURSE PRACTITIONER

## 2025-03-19 PROCEDURE — 36415 COLL VENOUS BLD VENIPUNCTURE: CPT | Performed by: NURSE PRACTITIONER

## 2025-03-19 PROCEDURE — 3074F SYST BP LT 130 MM HG: CPT | Performed by: NURSE PRACTITIONER

## 2025-03-19 PROCEDURE — G2211 COMPLEX E/M VISIT ADD ON: HCPCS | Performed by: NURSE PRACTITIONER

## 2025-03-19 PROCEDURE — 99214 OFFICE O/P EST MOD 30 MIN: CPT | Mod: 25 | Performed by: NURSE PRACTITIONER

## 2025-03-19 RX ORDER — ALBUTEROL SULFATE 0.83 MG/ML
2.5 SOLUTION RESPIRATORY (INHALATION) EVERY 6 HOURS PRN
Qty: 50 ML | Refills: 1 | Status: SHIPPED | OUTPATIENT
Start: 2025-03-19

## 2025-03-19 RX ORDER — PREDNISONE 20 MG/1
40 TABLET ORAL DAILY PRN
Qty: 14 TABLET | Refills: 1 | Status: SHIPPED | OUTPATIENT
Start: 2025-03-19

## 2025-03-19 RX ORDER — ALBUTEROL SULFATE 90 UG/1
2 INHALANT RESPIRATORY (INHALATION) EVERY 4 HOURS PRN
Qty: 18 G | Refills: 5 | Status: SHIPPED | OUTPATIENT
Start: 2025-03-19

## 2025-03-19 RX ORDER — ATORVASTATIN CALCIUM 20 MG/1
20 TABLET, FILM COATED ORAL DAILY
Qty: 90 TABLET | Refills: 3 | Status: SHIPPED | OUTPATIENT
Start: 2025-03-19

## 2025-03-19 NOTE — PROGRESS NOTES
"Preventive Care Visit  [facility]  Caryl Vidal NP, Family Medicine  Mar 19, 2025      Assessment & Plan     (E78.2) Mixed hyperlipidemia  (primary encounter diagnosis)  Comment:   Plan: Lipid panel reflex to direct LDL Non-fasting,         atorvastatin (LIPITOR) 20 MG tablet        Fasting today, stsable, taking meds    (Z00.00) Encounter for Medicare annual wellness exam  Comment:   Plan: REVIEW OF HEALTH MAINTENANCE PROTOCOL ORDERS            (Z80.42) Family history of prostate cancer  Comment:   Plan: PSA, screen        Dad and brother (dad living age 90s)    (Z12.5) Screening for prostate cancer  Comment:   Plan: PSA, screen            (R73.09) Elevated glucose  Comment:   Plan: Glucose        Has been elevated last 2 times, he is fasting today    (J45.50) Severe persistent asthma without complication (H)  Comment:   Plan: controlled, understands how to use steroid inhaler when environmental triggers are expected. Given prednisone to have on hand if suddenly severe symptoms    Lives in [facility] in [place] ([facility])            BMI  Estimated body mass index is 26.88 kg/m  as calculated from the following:    Height as of this encounter: 1.651 m (5' 5\").    Weight as of this encounter: 73.3 kg (161 lb 8 oz).       Counseling  Appropriate preventive services were addressed with this patient via screening, questionnaire, or discussion as appropriate for fall prevention, nutrition, physical activity, Tobacco-use cessation, social engagement, weight loss and cognition.  Checklist reviewing preventive services available has been given to the patient.  Reviewed patient's diet, addressing concerns and/or questions.   He is at risk for lack of exercise and has been provided with information to increase physical activity for the benefit of his well-being.   The patient reports drinking more than 3 alcoholic drinks per day and/or more than 7 drhnks per week. The patient was counseled and given " information about possible harmful effects of excessive alcohol intake.The patient was provided with written information regarding signs of hearing loss.           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 Has adequate suppy  Physical (Medicare Annual Wellness Visit)        3/19/2025     7:50 AM   Additional Questions   Roomed by aurelio jamil   Accompanied by self     Vision screening was not completed today as this is not a Welcome to Medicare Visit and patient has been covered by Medicare >1 year. Patient does/does not wear glasses/contacts. Last eye exam was 03/17/2025 ([facility]).     HPI    Hyperlipidemia Follow-Up    Are you regularly taking any medication or supplement to lower your cholesterol?   Yes- Atorvastatin  Are you having muscle aches or other side effects that you think could be caused by your cholesterol lowering medication?  No        5/28/2024     7:57 AM 7/1/2024    12:53 PM 3/18/2025     9:50 AM   ACT Total Scores   ACT TOTAL SCORE (Goal Greater than or Equal to 20) 10 22 19    In the past 12 months, how many times did you visit the emergency room for your asthma without being admitted to the hospital? 0 0 0   In the past 12 months, how many times were you hospitalized overnight because of your asthma? 0 0 0       Patient-reported          Advance Care Planning  Patient does not have a Health Care Directive: Patient states has Advance Directive and will bring in a copy to clinic.      3/18/2025   General Health   How would you rate your overall physical health? Good   Feel stress (tense, anxious, or unable to sleep) Not at all         3/18/2025   Nutrition   Diet: Regular (no restrictions)         3/18/2025   Exercise   Days per week of moderate/strenous exercise 2 days   Average minutes spent  exercising at this level 10 min   (!) EXERCISE CONCERN      3/18/2025   Social Factors   Frequency of gathering with friends or relatives Once a week   Worry food won't last until get money to buy more No   Food not last or not have enough money for food? No   Do you have housing? (Housing is defined as stable permanent housing and does not include staying ouside in a car, in a tent, in an abandoned building, in an overnight shelter, or couch-surfing.) Yes   Are you worried about losing your housing? No   Lack of transportation? No   Unable to get utilities (heat,electricity)? No         3/18/2025   Fall Risk   Fallen 2 or more times in the past year? No   Trouble with walking or balance? No          3/18/2025   Activities of Daily Living- Home Safety   Needs help with the following daily activites None of the above   Safety concerns in the home None of the above         3/18/2025   Dental   Dentist two times every year? Yes         3/18/2025   Hearing Screening   Hearing concerns? (!) I FEEL THAT PEOPLE ARE MUMBLING OR NOT SPEAKING CLEARLY.    (!) IT'S HARD TO FOLLOW A CONVERSATION IN A NOISY RESTAURANT OR CROWDED ROOM.    (!) TROUBLE UNDERSTANDING SOFT OR WHISPERED SPEECH.       Multiple values from one day are sorted in reverse-chronological order         3/18/2025   Driving Risk Screening   Patient/family members have concerns about driving No         3/18/2025   General Alertness/Fatigue Screening   Have you been more tired than usual lately? No         3/18/2025   Urinary Incontinence Screening   Bothered by leaking urine in past 6 months No           3/10/2024   TB Screening   Were you born outside of the [place]? No           Today's PHQ-2 Score:       3/18/2025     9:49 AM   PHQ-2 ( 1999 [employer])   Q1: Little interest or pleasure in doing things 0   Q2: Feeling down, depressed or hopeless 0   PHQ-2 Score 0    Q1: Little interest or pleasure in doing things Not at all   Q2: Feeling down, depressed or hopeless Not  at all   PHQ-2 Score 0       Patient-reported           3/18/2025   Substance Use   Alcohol more than 3/day or more than 7/wk Yes   How often do you have a drink containing alcohol 2 to 3 times a week   How many alcohol drinks on typical day 1 or 2   How often do you have 5+ drinks at one occasion Never   Audit 2/3 Score 0   How often not able to stop drinking once started Never   How often failed to do what normally expected Never   How often needed first drink in am after a heavy drinking session Never   How often feeling of guilt or remorse after drinking Never   How often unable to remember what happened the night before Never   Have you or someone else been injured because of your drinking No   Has anyone been concerned or suggested you cut down on drinking No   TOTAL SCORE - AUDIT 3   Do you have a current opioid prescription? No   How severe/bad is pain from 1 to 10? 0/10 (No Pain)   Do you use any other substances recreationally? No     Social History     Tobacco Use    Smoking status: Never     Passive exposure: Never    Smokeless tobacco: Never   Vaping Use    Vaping status: Never Used   Substance Use Topics    Alcohol use: Yes    Drug use: Never           3/18/2025   AAA Screening   Family history of Abdominal Aortic Aneurysm (AAA)? No   Last PSA:   Prostate Specific Antigen Screen   Date Value Ref Range Status   02/21/2023 1.23 0.00 - 4.50 ng/mL Final   01/20/2021 1.3 ng/mL Final     ASCVD Risk   The 10-year ASCVD risk score (Geremias GAMA, et al., 2019) is: 12.2%    Values used to calculate the score:      Age: 69 years      Sex: Male      Is Non- : No      Diabetic: No      Tobacco smoker: No      Systolic Blood Pressure: 128 mmHg      Is BP treated: No      HDL Cholesterol: 58 mg/dL      Total Cholesterol: 167 mg/dL            Reviewed and updated as needed this visit by Provider                      Current providers sharing in care for this patient include:  Patient Care  "Team:  Wayne Reddy MD as PCP - General (Family Medicine)  Wayne Reddy MD as Assigned PCP  Christopher Lu MD as MD (Urology)    The following health maintenance items are reviewed in Epic and correct as of today:  Health Maintenance   Topic Date Due    ASTHMA ACTION PLAN  Never done    ZOSTER IMMUNIZATION (1 of 2) Never done    LIPID  03/11/2025    ANNUAL REVIEW OF HM ORDERS  03/11/2025    MEDICARE ANNUAL WELLNESS VISIT  03/11/2025    HEPATITIS C SCREENING  02/21/2028 (Originally 8/3/1973)    COVID-19 Vaccine (8 - Moderna risk 2024-25 season) 06/09/2025    ASTHMA CONTROL TEST  09/19/2025    FALL RISK ASSESSMENT  03/19/2026    DIABETES SCREENING  03/11/2027    ADVANCE CARE PLANNING  03/11/2029    COLORECTAL CANCER SCREENING  02/07/2030    DTAP/TDAP/TD IMMUNIZATION (3 - Td or Tdap) 10/24/2031    PHQ-2 (once per calendar year)  Completed    INFLUENZA VACCINE  Completed    Pneumococcal Vaccine: 50+ Years  Completed    RSV VACCINE  Completed    HPV IMMUNIZATION  Aged Out    MENINGITIS IMMUNIZATION  Aged Out            Objective    Exam  /82 (BP Location: Right arm, Patient Position: Sitting)   Pulse 72   Temp 97.9  F (36.6  C)   Resp 16   Ht 1.651 m (5' 5\")   Wt 73.3 kg (161 lb 8 oz)   SpO2 96%   BMI 26.88 kg/m     Estimated body mass index is 26.88 kg/m  as calculated from the following:    Height as of this encounter: 1.651 m (5' 5\").    Weight as of this encounter: 73.3 kg (161 lb 8 oz).    Physical Exam  GENERAL: alert and no distress  NECK: no adenopathy, no asymmetry, masses, or scars  RESP: lungs clear to auscultation - no rales, rhonchi or wheezes  CV: regular rate and rhythm, normal S1 S2, no S3 or S4, no murmur, click or rub, no peripheral edema  MS: no gross musculoskeletal defects noted, no edema         3/19/2025   Mini Cog   Clock Draw Score 2 Normal   3 Item Recall 3 objects recalled   Mini Cog Total Score 5             3/22/2023   Vision Screen   Vision Screen Results REFER "     Had recent eye appointment, needs readers    Is not hearing aids  Signed Electronically by: Caryl Vidal NP

## 2025-03-19 NOTE — TELEPHONE ENCOUNTER
Prior Authorization Retail Medication Request    Medication/Dose: albuterol (PROVENTIL) (2.5 MG/3ML) 0.083% neb solution    Diagnosis and ICD code (if different than what is on RX):  J45.50     New/renewal/insurance change PA/secondary ins. PA:  Previously Tried and Failed:    Rationale:      Insurance   Primary:   Insurance ID:      Secondary (if applicable):  Insurance ID:      Pharmacy Information (if different than what is on RX)  Name[facility]  Phone: [phone]  Fax: [phone]    Clinic Information  Preferred routing pool for dept communication: [facility] Pool     CoverMyMeds Key: [id number]

## 2025-03-19 NOTE — PATIENT INSTRUCTIONS
Bring copy of living will to scan    Patient Education   Preventive Care Advice   This is general advice given by our system to help you stay healthy. However, your care team may have specific advice just for you. Please talk to your care team about your preventive care needs.  Nutrition  Eat 5 or more servings of fruits and vegetables each day.  Try wheat bread, brown rice and whole grain pasta (instead of white bread, rice, and pasta).  Get enough calcium and vitamin D. Check the label on foods and aim for 100% of the RDA (recommended daily allowance).  Lifestyle  Exercise at least 150 minutes each week  (30 minutes a day, 5 days a week).  Do muscle strengthening activities 2 days a week. These help control your weight and prevent disease.  No smoking.  Wear sunscreen to prevent skin cancer.  Have a dental exam and cleaning every 6 months.  Yearly exams  See your health care team every year to talk about:  Any changes in your health.  Any medicines your care team has prescribed.  Preventive care, family planning, and ways to prevent chronic diseases.  Shots (vaccines)   HPV shots (up to age 26), if you've never had them before.  Hepatitis B shots (up to age 59), if you've never had them before.  COVID-19 shot: Get this shot when it's due.  Flu shot: Get a flu shot every year.  Tetanus shot: Get a tetanus shot every 10 years.  Pneumococcal, hepatitis A, and RSV shots: Ask your care team if you need these based on your risk.  Shingles shot (for age 50 and up)  General health tests  Diabetes screening:  Starting at age 35, Get screened for diabetes at least every 3 years.  If you are younger than age 35, ask your care team if you should be screened for diabetes.  Cholesterol test: At age 39, start having a cholesterol test every 5 years, or more often if advised.  Bone density scan (DEXA): At age 50, ask your care team if you should have this scan for osteoporosis (brittle bones).  Hepatitis C: Get tested at  least once in your life.  STIs (sexually transmitted infections)  Before age 24: Ask your care team if you should be screened for STIs.  After age 24: Get screened for STIs if you're at risk. You are at risk for STIs (including HIV) if:  You are sexually active with more than one person.  You don't use condoms every time.  You or a partner was diagnosed with a sexually transmitted infection.  If you are at risk for HIV, ask about PrEP medicine to prevent HIV.  Get tested for HIV at least once in your life, whether you are at risk for HIV or not.  Cancer screening tests  Cervical cancer screening: If you have a cervix, begin getting regular cervical cancer screening tests starting at age 21.  Breast cancer scan (mammogram): If you've ever had breasts, begin having regular mammograms starting at age 40. This is a scan to check for breast cancer.  Colon cancer screening: It is important to start screening for colon cancer at age 45.  Have a colonoscopy test every 10 years (or more often if you're at risk) Or, ask your provider about stool tests like a FIT test every year or Cologuard test every 3 years.  To learn more about your testing options, visit:   .  For help making a decision, visit:   https://bit.ly/pq44261.  Prostate cancer screening test: If you have a prostate, ask your care team if a prostate cancer screening test (PSA) at age 55 is right for you.  Lung cancer screening: If you are a current or former smoker ages 50 to 80, ask your care team if ongoing lung cancer screenings are right for you.  For informational purposes only. Not to replace the advice of your health care provider. Copyright   2023 [facility] [employer]. All rights reserved. Clinically reviewed by the [facility]. [employer] [id number] - REV 01/24.  Hearing Loss: Care Instructions  Overview     Hearing loss is a sudden or slow decrease in how well you hear. It can range from slight to profound. Permanent hearing  loss can occur with aging. It also can happen when you are exposed long-term to loud noise. Examples include listening to loud music, riding motorcycles, or being around other loud machines.  Hearing loss can affect your work and home life. It can make you feel lonely or depressed. You may feel that you have lost your independence. But hearing aids and other devices can help you hear better and feel connected to others.  Follow-up care is a key part of your treatment and safety. Be sure to make and go to all appointments, and call your doctor if you are having problems. It's also a good idea to know your test results and keep a list of the medicines you take.  How can you care for yourself at home?  Avoid loud noises whenever possible. This helps keep your hearing from getting worse.  Always wear hearing protection around loud noises.  Wear a hearing aid as directed.  A professional can help you pick a hearing aid that will work best for you.  You can also get hearing aids over the counter for mild to moderate hearing loss.  Have hearing tests as your doctor suggests. They can show whether your hearing has changed. Your hearing aid may need to be adjusted.  Use other devices as needed. These may include:  Telephone amplifiers and hearing aids that can connect to a television, stereo, radio, or microphone.  Devices that use lights or vibrations. These alert you to the doorbell, a ringing telephone, or a baby monitor.  Television closed-captioning. This shows the words at the bottom of the screen. Most new TVs can do this.  TTY (text telephone). This lets you type messages back and forth on the telephone instead of talking or listening. These devices are also called TDD. When messages are typed on the keyboard, they are sent over the phone line to a receiving TTY. The message is shown on a monitor.  Use text messaging, social media, and email if it is hard for you to communicate by telephone.  Try to learn a listening  "technique called speechreading. It is not lipreading. You pay attention to people's gestures, expressions, posture, and tone of voice. These clues can help you understand what a person is saying. Face the person you are talking to, and have them face you. Make sure the lighting is good. You need to see the other person's face clearly.  Think about counseling if you need help to adjust to your hearing loss.  When should you call for help?  Watch closely for changes in your health, and be sure to contact your doctor if:    You think your hearing is getting worse.     You have new symptoms, such as dizziness or nausea.   Where can you learn more?  Go to https://www.Apisphere.net/patiented  Enter R798 in the search box to learn more about \"Hearing Loss: Care Instructions.\"  Current as of: October 27, 2024  Content Version: 14.4    3693-9532 [employer].   Care instructions adapted under license by your healthcare professional. If you have questions about a medical condition or this instruction, always ask your healthcare professional. [employer] disclaims any warranty or liability for your use of this information.    9 Ways to Cut Back on Drinking  Maybe you've found yourself drinking more alcohol than you'd prefer. If you want to cut back, here are some ideas to try.    Think before you drink.  Do you really want a drink, or is it just a habit? If you're used to having a drink at a certain time, try doing something else then.     Look for substitutes.  Find some no-alcohol drinks that you enjoy, like flavored seltzer water, tea with honey, or tonic with a slice of lime. Or try alcohol-free beer or \"virgin\" cocktails (without the alcohol).     Drink more water.  Use water to quench your thirst. Drink a glass of water before you have any alcohol. Have another glass along with every drink or between drinks.     Shrink your drink.  For example, have a bottle of beer instead of a pint. Use a smaller " "glass for wine. Choose drinks with lower alcohol content (ABV%). Or use less liquor and more mixer in cocktails.     Slow down.  It's easy to drink quickly and without thinking about it. Pay attention, and make each drink last longer.     Do the math.  Total up how much you spend on alcohol each month. How much is that a year? If you cut back, what could you do with the money you save?     Take a break.  Choose a day or two each week when you won't drink at all. Notice how you feel on those days, physically and emotionally. How did you sleep? Do you feel better? Over time, add more break days.     Count calories.  Would you like to lose some weight? For some people that's a good motivator for cutting back. Figure out how many calories are in each drink. How many does that add up to in a day? In a week? In a month?     Practice saying no.  Be ready when someone offers you a drink. Try: \"Thanks, I've had enough.\" Or \"Thanks, but I'm cutting back.\" Or \"No, thanks. I feel better when I drink less.\"   Current as of: August 20, 2024  Content Version: 14.4    6669-3804 [employer].   Care instructions adapted under license by your healthcare professional. If you have questions about a medical condition or this instruction, always ask your healthcare professional. [employer] disclaims any warranty or liability for your use of this information.     "

## 2025-03-20 LAB
CHOLEST SERPL-MCNC: 167 MG/DL
FASTING STATUS PATIENT QL REPORTED: ABNORMAL
FASTING STATUS PATIENT QL REPORTED: NORMAL
GLUCOSE SERPL-MCNC: 101 MG/DL (ref 70–99)
HDLC SERPL-MCNC: 56 MG/DL
LDLC SERPL CALC-MCNC: 95 MG/DL
NONHDLC SERPL-MCNC: 111 MG/DL
PSA SERPL DL<=0.01 NG/ML-MCNC: 1 NG/ML (ref 0–4.5)
TRIGL SERPL-MCNC: 79 MG/DL

## 2025-03-24 NOTE — TELEPHONE ENCOUNTER
PRIOR AUTHORIZATION DENIED    Medication: ALBUTEROL SULFATE (2.5 MG/3ML) 0.083% IN NEBU  Insurance Company: [employer] - Phone [phone] Fax [phone]  Denial Date: 3/24/2025  Denial Reason(s): not covered under Part D plan - may be able to be billed under Part A or B  Appeal Information: na  Patient Notified: no

## 2025-03-27 NOTE — TELEPHONE ENCOUNTER
I called and spoke with patient, patient has enough medication and told the pharmacy to cancel the prior authorization and to keep the medication active on his list. Patient does not need medication at this time, and uses if very rarely. I verbalized understanding, encounter closed.

## 2025-03-27 NOTE — TELEPHONE ENCOUNTER
He may have to use his albuterol inhaler instead of the nebulizer if he cannot get this covered under Medicare part A or B.  The pharmacy would have to submit this for coverage under part A or B.

## 2025-04-27 ENCOUNTER — HEALTH MAINTENANCE LETTER (OUTPATIENT)
Age: 70
End: 2025-04-27

## 2025-06-08 ENCOUNTER — HEALTH MAINTENANCE LETTER (OUTPATIENT)
Age: 70
End: 2025-06-08

## 2025-07-03 ENCOUNTER — OFFICE VISIT (OUTPATIENT)
Dept: FAMILY MEDICINE | Facility: CLINIC | Age: 70
End: 2025-07-03
Payer: COMMERCIAL

## 2025-07-03 VITALS
BODY MASS INDEX: 26.71 KG/M2 | OXYGEN SATURATION: 95 % | HEIGHT: 65 IN | SYSTOLIC BLOOD PRESSURE: 116 MMHG | WEIGHT: 160.3 LBS | TEMPERATURE: 98.3 F | HEART RATE: 78 BPM | DIASTOLIC BLOOD PRESSURE: 78 MMHG | RESPIRATION RATE: 16 BRPM

## 2025-07-03 DIAGNOSIS — H60.332 ACUTE SWIMMER'S EAR OF LEFT SIDE: Primary | ICD-10-CM

## 2025-07-03 RX ORDER — CIPROFLOXACIN AND DEXAMETHASONE 3; 1 MG/ML; MG/ML
4 SUSPENSION/ DROPS AURICULAR (OTIC) 2 TIMES DAILY
Status: DISCONTINUED | OUTPATIENT
Start: 2025-07-03 | End: 2025-07-03

## 2025-07-03 RX ORDER — CIPROFLOXACIN AND DEXAMETHASONE 3; 1 MG/ML; MG/ML
4 SUSPENSION/ DROPS AURICULAR (OTIC) 2 TIMES DAILY
Qty: 7.5 ML | Refills: 0 | Status: SHIPPED | OUTPATIENT
Start: 2025-07-03 | End: 2025-07-10

## 2025-07-03 ASSESSMENT — ASTHMA QUESTIONNAIRES
QUESTION_2 LAST FOUR WEEKS HOW OFTEN HAVE YOU HAD SHORTNESS OF BREATH: ONCE OR TWICE A WEEK
ACT_TOTALSCORE: 22
QUESTION_3 LAST FOUR WEEKS HOW OFTEN DID YOUR ASTHMA SYMPTOMS (WHEEZING, COUGHING, SHORTNESS OF BREATH, CHEST TIGHTNESS OR PAIN) WAKE YOU UP AT NIGHT OR EARLIER THAN USUAL IN THE MORNING: NOT AT ALL
QUESTION_5 LAST FOUR WEEKS HOW WOULD YOU RATE YOUR ASTHMA CONTROL: WELL CONTROLLED
QUESTION_4 LAST FOUR WEEKS HOW OFTEN HAVE YOU USED YOUR RESCUE INHALER OR NEBULIZER MEDICATION (SUCH AS ALBUTEROL): ONCE A WEEK OR LESS
QUESTION_1 LAST FOUR WEEKS HOW MUCH OF THE TIME DID YOUR ASTHMA KEEP YOU FROM GETTING AS MUCH DONE AT WORK, SCHOOL OR AT HOME: NONE OF THE TIME

## 2025-07-03 NOTE — PROGRESS NOTES
"  Assessment & Plan     Acute swimmer's ear of left side  Analgesics and antipyretics as needed, symptomatic care, follow up if not improving   - ciprofloxacin-dexAMETHasone (CIPRODEX) 0.3-0.1 % otic suspension           BMI  Estimated body mass index is 26.68 kg/m  as calculated from the following:    Height as of this encounter: 1.651 m (5' 5\").    Weight as of this encounter: 72.7 kg (160 lb 4.8 oz).             Cynthia Mckeon is a 69 year old, presenting for the following health issues:  Ear Problem (C/o possible left ear infection--having increased pain, drainage.   Was in pool a few days ago)        7/3/2025    10:40 AM   Additional Questions   Roomed by Sade BUTTERFIELD CMA     Ear Problem    History of Present Illness       Reason for visit:  Ear infection  Symptom onset:  3-7 days ago  Symptoms include:  Soreness draining  Symptom intensity:  Severe  Symptom progression:  Worsening  Had these symptoms before:  Yes  Has tried/received treatment for these symptoms:  Yes  Previous treatment was successful:  Yes  Prior treatment description:  Antibiotics  What makes it worse:  No  What makes it better:  No   He is taking medications regularly.                  Objective    /78 (BP Location: Right arm, Patient Position: Sitting, Cuff Size: Adult Regular)   Pulse 78   Temp 98.3  F (36.8  C) (Tympanic)   Resp 16   Ht 1.651 m (5' 5\")   Wt 72.7 kg (160 lb 4.8 oz)   SpO2 95%   BMI 26.68 kg/m    Body mass index is 26.68 kg/m .  Physical Exam   GENERAL: alert and no distress  EYES: Eyes grossly normal to inspection, PERRL and conjunctivae and sclerae normal  HENT: normal cephalic/atraumatic, left ear: red and boggy canal, and nose and mouth without ulcers or lesions  NECK: no adenopathy, no asymmetry, masses, or scars  RESP: lungs clear to auscultation - no rales, rhonchi or wheezes  CV: regular rate and rhythm, normal S1 S2, no S3 or S4, no murmur, click or rub, no peripheral edema            Signed " Electronically by: Wayne Reddy MD

## 2025-07-21 ASSESSMENT — ASTHMA QUESTIONNAIRES: ACT_TOTALSCORE: 10

## 2025-07-22 ENCOUNTER — TELEPHONE (OUTPATIENT)
Dept: FAMILY MEDICINE | Facility: CLINIC | Age: 70
End: 2025-07-22

## 2025-07-22 ENCOUNTER — OFFICE VISIT (OUTPATIENT)
Dept: FAMILY MEDICINE | Facility: CLINIC | Age: 70
End: 2025-07-22
Payer: COMMERCIAL

## 2025-07-22 VITALS
SYSTOLIC BLOOD PRESSURE: 110 MMHG | TEMPERATURE: 97.9 F | BODY MASS INDEX: 26.37 KG/M2 | WEIGHT: 158.3 LBS | DIASTOLIC BLOOD PRESSURE: 62 MMHG | HEART RATE: 65 BPM | HEIGHT: 65 IN | OXYGEN SATURATION: 92 %

## 2025-07-22 DIAGNOSIS — R05.3 CHRONIC COUGH: ICD-10-CM

## 2025-07-22 DIAGNOSIS — R06.2 WHEEZING: ICD-10-CM

## 2025-07-22 DIAGNOSIS — J45.20 MILD INTERMITTENT ASTHMA, UNSPECIFIED WHETHER COMPLICATED: ICD-10-CM

## 2025-07-22 PROCEDURE — 99214 OFFICE O/P EST MOD 30 MIN: CPT | Performed by: NURSE PRACTITIONER

## 2025-07-22 PROCEDURE — 3078F DIAST BP <80 MM HG: CPT | Performed by: NURSE PRACTITIONER

## 2025-07-22 PROCEDURE — 3074F SYST BP LT 130 MM HG: CPT | Performed by: NURSE PRACTITIONER

## 2025-07-22 PROCEDURE — G2211 COMPLEX E/M VISIT ADD ON: HCPCS | Performed by: NURSE PRACTITIONER

## 2025-07-22 RX ORDER — PREDNISONE 10 MG/1
TABLET ORAL
Qty: 40 TABLET | Refills: 0 | Status: SHIPPED | OUTPATIENT
Start: 2025-07-22 | End: 2025-08-07

## 2025-07-22 RX ORDER — ALBUTEROL SULFATE 0.83 MG/ML
2.5 SOLUTION RESPIRATORY (INHALATION) EVERY 6 HOURS PRN
Qty: 90 ML | Refills: 1 | Status: SHIPPED | OUTPATIENT
Start: 2025-07-22

## 2025-07-22 NOTE — TELEPHONE ENCOUNTER
Prior Authorization Retail Medication Request    Medication/Dose: albuterol (PROVENTIL) (2.5 MG/3ML) 0.083% neb solution   Diagnosis and ICD code (if different than what is on RX):    New/renewal/insurance change PA/secondary ins. PA:  Previously Tried and Failed:    Rationale:      MEDICA MEDICA PRIME SOLUTION [phone]      Primary Visit Coverage Subscriber    ID Name SSN Address   [id number] VIPIN FRANCISCO         Secondary (if applicable):  Insurance ID:      [id number] [place], [place] - [address] [place] [address] AT [facility] OF [address]       Telephone Fax   [phone]     Clinic Information  Preferred routing pool for dept communication: [facility] POOL

## 2025-07-22 NOTE — PROGRESS NOTES
"  Assessment & Plan   (R05.3) Chronic cough  Comment:   Plan: fluticasone (FLOVENT DISKUS) 50 MCG/ACT inhaler        For 3 weeks, similar episode early  in the year    (J45.20) Mild intermittent asthma, unspecified whether complicated  Comment:   Plan: fluticasone (FLOVENT DISKUS) 50 MCG/ACT         inhaler, albuterol (PROVENTIL) (2.5 MG/3ML)         0.083% neb solution            (R06.2) Wheezing  Comment:   Plan: predniSONE (DELTASONE) 10 MG tablet,         fluticasone (FLOVENT DISKUS) 50 MCG/ACT         inhaler, albuterol (PROVENTIL) (2.5 MG/3ML)         0.083% neb solution          Will add prednisone, expect that his soybean allergy triggers this as well and hsould add antihistamine daily as well as prednisone burst and albuterol nebs and wear a mask during harvest. To clinic or ER if worsening    Last CXR 1 year ago  BMI  Estimated body mass index is 26.34 kg/m  as calculated from the following:    Height as of this encounter: 1.651 m (5' 5\").    Weight as of this encounter: 71.8 kg (158 lb 4.8 oz).           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  He exercises with enough effort to increase his heart rate 3 or less days per week.   He is taking medications regularly.          7/21/2025   Asthma   1.  In the past 4 weeks, how much of the time did your asthma keep you from getting as much done at work, school or at home? 5   2.  During the past 4 " "weeks, how often have you had shortness of breath? 1   3.  During the past 4 weeks, how often did your asthma symptoms (wheezing, coughing, shortness of breath, chest tightness or pain) wake you up at night or earlier than usual in the morning? 1   4.  During the past 4 weeks, how often have you used your rescue inhaler or nebulizer medication (such as albuterol)? 1   5.  How would you rate your asthma control during the past 4 weeks? 2   ACT TOTAL SCORE (Goal Greater than or Equal to 20) 10    In the past 12 months, how many times did you visit the emergency room for your asthma without being admitted to the hospital? 0   In the past 12 months, how many times were you hospitalized overnight because of your asthma? 0   Do you have a cough, wheezing or shortness of breath? (!) COUGH   What makes your asthma/breathing worse? Other   Do you want more information about how to use your inhaler? No       Patient-reported                 Objective    /62 (BP Location: Right arm, Patient Position: Sitting)   Pulse 65   Temp 97.9  F (36.6  C)   Ht 1.651 m (5' 5\")   Wt 71.8 kg (158 lb 4.8 oz)   SpO2 92%   BMI 26.34 kg/m    Body mass index is 26.34 kg/m .  Physical Exam   GENERAL: alert and no distress  NECK: no adenopathy, no asymmetry, masses, or scars  RESP: lungs wheezes in bases to auscultation - no rales, rhonchCV: regular rate and rhythm, normal S1 S2, no S3 or S4, no murmur, click or rub, no peripheral edema  MS: no gross musculoskeletal defects noted, no edema          The longitudinal plan of care for the diagnosis(es)/condition(s) as documented were addressed during this visit. Due to the added complexity in care, I will continue to support Mike in the subsequent management and with ongoing continuity of care.    Signed Electronically by: Caryl Vidal NP    "

## 2025-07-25 DIAGNOSIS — J45.20 MILD INTERMITTENT ASTHMA, UNSPECIFIED WHETHER COMPLICATED: Primary | ICD-10-CM

## 2025-07-25 DIAGNOSIS — R06.2 WHEEZING: ICD-10-CM

## 2025-07-26 NOTE — TELEPHONE ENCOUNTER
Retail Pharmacy Prior Authorization Team   Phone: [phone]    PA Initiation    Medication: ALBUTEROL SULFATE (2.5 MG/3ML) 0.083% IN NEBU  Insurance Company: [employer] - Phone [phone] Fax [phone]  Pharmacy Filling the Rx: [id number] [place], [place] - [address] [place] [address] AT [facility] [address]  Filling Pharmacy Phone: [phone]  Filling Pharmacy Fax:    Start Date: 7/26/2025    VIPIN FRANCISCO (Key: [id number])

## 2025-07-28 NOTE — TELEPHONE ENCOUNTER
PRIOR AUTHORIZATION DENIED    MUST BE BILLED TO PART B BENEFITS - NOTIFIED PHARMACY    Medication: ALBUTEROL SULFATE (2.5 MG/3ML) 0.083% IN NEBU  Insurance Company: [employer] - Phone [phone] Fax [phone]  Denial Date: 7/27/2025  Denial Reason(s): NOT COVERED UNDER PART D  Appeal Information: N/A  Patient Notified: NO

## 2025-07-28 NOTE — TELEPHONE ENCOUNTER
Not sure if this is meant for me. Looks like albuterol is not covered under part D and needs to be billed to part B?

## 2025-08-09 ENCOUNTER — OFFICE VISIT (OUTPATIENT)
Dept: URGENT CARE | Facility: URGENT CARE | Age: 70
End: 2025-08-09
Payer: COMMERCIAL

## 2025-08-09 VITALS
TEMPERATURE: 98 F | OXYGEN SATURATION: 96 % | SYSTOLIC BLOOD PRESSURE: 124 MMHG | RESPIRATION RATE: 16 BRPM | BODY MASS INDEX: 26.46 KG/M2 | DIASTOLIC BLOOD PRESSURE: 65 MMHG | WEIGHT: 159 LBS | HEART RATE: 71 BPM

## 2025-08-09 DIAGNOSIS — S05.01XA ABRASION OF RIGHT CORNEA, INITIAL ENCOUNTER: Primary | ICD-10-CM

## 2025-08-09 PROCEDURE — 3078F DIAST BP <80 MM HG: CPT

## 2025-08-09 PROCEDURE — 3074F SYST BP LT 130 MM HG: CPT

## 2025-08-09 PROCEDURE — 99213 OFFICE O/P EST LOW 20 MIN: CPT

## 2025-08-09 RX ORDER — TOBRAMYCIN 3 MG/ML
1-2 SOLUTION/ DROPS OPHTHALMIC
Qty: 5 ML | Refills: 0 | Status: SHIPPED | OUTPATIENT
Start: 2025-08-09 | End: 2025-08-09

## 2025-08-09 RX ORDER — POLYMYXIN B SULFATE AND TRIMETHOPRIM 1; 10000 MG/ML; [USP'U]/ML
1-2 SOLUTION OPHTHALMIC EVERY 6 HOURS
Qty: 10 ML | Refills: 0 | Status: SHIPPED | OUTPATIENT
Start: 2025-08-09 | End: 2025-08-14